# Patient Record
Sex: MALE | Race: OTHER | HISPANIC OR LATINO
[De-identification: names, ages, dates, MRNs, and addresses within clinical notes are randomized per-mention and may not be internally consistent; named-entity substitution may affect disease eponyms.]

---

## 2020-07-02 ENCOUNTER — TRANSCRIPTION ENCOUNTER (OUTPATIENT)
Age: 40
End: 2020-07-02

## 2020-07-02 ENCOUNTER — EMERGENCY (EMERGENCY)
Facility: HOSPITAL | Age: 40
LOS: 1 days | Discharge: HOME | End: 2020-07-02
Attending: EMERGENCY MEDICINE | Admitting: EMERGENCY MEDICINE
Payer: MEDICAID

## 2020-07-02 VITALS
HEART RATE: 73 BPM | SYSTOLIC BLOOD PRESSURE: 163 MMHG | TEMPERATURE: 98 F | DIASTOLIC BLOOD PRESSURE: 89 MMHG | RESPIRATION RATE: 18 BRPM | OXYGEN SATURATION: 99 %

## 2020-07-02 PROCEDURE — 76705 ECHO EXAM OF ABDOMEN: CPT | Mod: 26

## 2020-07-02 PROCEDURE — 99291 CRITICAL CARE FIRST HOUR: CPT | Mod: 25

## 2020-07-02 PROCEDURE — 76870 US EXAM SCROTUM: CPT | Mod: RT

## 2020-07-02 RX ADMIN — MORPHINE SULFATE 6 MILLIGRAM(S): 50 CAPSULE, EXTENDED RELEASE ORAL at 23:50
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
## 2020-07-03 ENCOUNTER — RESULT REVIEW (OUTPATIENT)
Age: 40
End: 2020-07-03

## 2020-07-03 ENCOUNTER — TRANSCRIPTION ENCOUNTER (OUTPATIENT)
Age: 40
End: 2020-07-03

## 2020-07-03 ENCOUNTER — INPATIENT (INPATIENT)
Facility: HOSPITAL | Age: 40
LOS: 2 days | Discharge: HOME | DRG: 511 | End: 2020-07-06
Attending: SURGERY | Admitting: SURGERY
Payer: MEDICAID

## 2020-07-03 VITALS
OXYGEN SATURATION: 99 % | DIASTOLIC BLOOD PRESSURE: 84 MMHG | TEMPERATURE: 99 F | HEART RATE: 104 BPM | SYSTOLIC BLOOD PRESSURE: 172 MMHG | RESPIRATION RATE: 20 BRPM

## 2020-07-03 VITALS
DIASTOLIC BLOOD PRESSURE: 97 MMHG | OXYGEN SATURATION: 97 % | RESPIRATION RATE: 17 BRPM | SYSTOLIC BLOOD PRESSURE: 184 MMHG | HEART RATE: 94 BPM

## 2020-07-03 DIAGNOSIS — S31.30XA UNSPECIFIED OPEN WOUND OF SCROTUM AND TESTES, INITIAL ENCOUNTER: ICD-10-CM

## 2020-07-03 LAB
ALBUMIN SERPL ELPH-MCNC: 4.5 G/DL — SIGNIFICANT CHANGE UP (ref 3.3–5)
ALP SERPL-CCNC: 47 U/L — SIGNIFICANT CHANGE UP (ref 40–120)
ALT FLD-CCNC: 48 U/L — HIGH (ref 10–45)
ANION GAP SERPL CALC-SCNC: 16 MMOL/L — SIGNIFICANT CHANGE UP (ref 5–17)
APPEARANCE UR: CLEAR — SIGNIFICANT CHANGE UP
APTT BLD: 30.7 SEC — SIGNIFICANT CHANGE UP (ref 27.5–35.5)
AST SERPL-CCNC: 67 U/L — HIGH (ref 10–40)
BASOPHILS # BLD AUTO: 0.05 K/UL — SIGNIFICANT CHANGE UP (ref 0–0.2)
BASOPHILS NFR BLD AUTO: 0.4 % — SIGNIFICANT CHANGE UP (ref 0–2)
BILIRUB SERPL-MCNC: 0.7 MG/DL — SIGNIFICANT CHANGE UP (ref 0.2–1.2)
BILIRUB UR-MCNC: NEGATIVE — SIGNIFICANT CHANGE UP
BLD GP AB SCN SERPL QL: NEGATIVE — SIGNIFICANT CHANGE UP
BUN SERPL-MCNC: 14 MG/DL — SIGNIFICANT CHANGE UP (ref 7–23)
CALCIUM SERPL-MCNC: 9 MG/DL — SIGNIFICANT CHANGE UP (ref 8.4–10.5)
CHLORIDE SERPL-SCNC: 98 MMOL/L — SIGNIFICANT CHANGE UP (ref 96–108)
CO2 SERPL-SCNC: 23 MMOL/L — SIGNIFICANT CHANGE UP (ref 22–31)
COLOR SPEC: SIGNIFICANT CHANGE UP
CREAT SERPL-MCNC: 1.06 MG/DL — SIGNIFICANT CHANGE UP (ref 0.5–1.3)
DIFF PNL FLD: NEGATIVE — SIGNIFICANT CHANGE UP
EOSINOPHIL # BLD AUTO: 0.14 K/UL — SIGNIFICANT CHANGE UP (ref 0–0.5)
EOSINOPHIL NFR BLD AUTO: 1 % — SIGNIFICANT CHANGE UP (ref 0–6)
ETHANOL SERPL-MCNC: SIGNIFICANT CHANGE UP MG/DL (ref 0–10)
GAS PNL BLDV: SIGNIFICANT CHANGE UP
GLUCOSE SERPL-MCNC: 114 MG/DL — HIGH (ref 70–99)
GLUCOSE UR QL: NEGATIVE — SIGNIFICANT CHANGE UP
HCT VFR BLD CALC: 44.3 % — SIGNIFICANT CHANGE UP (ref 39–50)
HGB BLD-MCNC: 15.5 G/DL — SIGNIFICANT CHANGE UP (ref 13–17)
IMM GRANULOCYTES NFR BLD AUTO: 0.4 % — SIGNIFICANT CHANGE UP (ref 0–1.5)
INR BLD: 1.25 RATIO — HIGH (ref 0.88–1.16)
KETONES UR-MCNC: ABNORMAL
LEUKOCYTE ESTERASE UR-ACNC: NEGATIVE — SIGNIFICANT CHANGE UP
LIDOCAIN IGE QN: 22 U/L — SIGNIFICANT CHANGE UP (ref 7–60)
LYMPHOCYTES # BLD AUTO: 1.05 K/UL — SIGNIFICANT CHANGE UP (ref 1–3.3)
LYMPHOCYTES # BLD AUTO: 7.6 % — LOW (ref 13–44)
MCHC RBC-ENTMCNC: 31.1 PG — SIGNIFICANT CHANGE UP (ref 27–34)
MCHC RBC-ENTMCNC: 35 GM/DL — SIGNIFICANT CHANGE UP (ref 32–36)
MCV RBC AUTO: 89 FL — SIGNIFICANT CHANGE UP (ref 80–100)
MONOCYTES # BLD AUTO: 1.08 K/UL — HIGH (ref 0–0.9)
MONOCYTES NFR BLD AUTO: 7.8 % — SIGNIFICANT CHANGE UP (ref 2–14)
NEUTROPHILS # BLD AUTO: 11.53 K/UL — HIGH (ref 1.8–7.4)
NEUTROPHILS NFR BLD AUTO: 82.8 % — HIGH (ref 43–77)
NITRITE UR-MCNC: NEGATIVE — SIGNIFICANT CHANGE UP
NRBC # BLD: 0 /100 WBCS — SIGNIFICANT CHANGE UP (ref 0–0)
PH UR: 6.5 — SIGNIFICANT CHANGE UP (ref 5–8)
PLATELET # BLD AUTO: 283 K/UL — SIGNIFICANT CHANGE UP (ref 150–400)
POTASSIUM SERPL-MCNC: 3.7 MMOL/L — SIGNIFICANT CHANGE UP (ref 3.5–5.3)
POTASSIUM SERPL-SCNC: 3.7 MMOL/L — SIGNIFICANT CHANGE UP (ref 3.5–5.3)
PROT SERPL-MCNC: 7.1 G/DL — SIGNIFICANT CHANGE UP (ref 6–8.3)
PROT UR-MCNC: ABNORMAL
PROTHROM AB SERPL-ACNC: 14.2 SEC — HIGH (ref 10.6–13.6)
RBC # BLD: 4.98 M/UL — SIGNIFICANT CHANGE UP (ref 4.2–5.8)
RBC # FLD: 12.1 % — SIGNIFICANT CHANGE UP (ref 10.3–14.5)
RH IG SCN BLD-IMP: POSITIVE — SIGNIFICANT CHANGE UP
RH IG SCN BLD-IMP: POSITIVE — SIGNIFICANT CHANGE UP
SARS-COV-2 RNA SPEC QL NAA+PROBE: SIGNIFICANT CHANGE UP
SODIUM SERPL-SCNC: 137 MMOL/L — SIGNIFICANT CHANGE UP (ref 135–145)
SP GR SPEC: >1.05 (ref 1.01–1.02)
UROBILINOGEN FLD QL: NEGATIVE — SIGNIFICANT CHANGE UP
WBC # BLD: 13.9 K/UL — HIGH (ref 3.8–10.5)
WBC # FLD AUTO: 13.9 K/UL — HIGH (ref 3.8–10.5)

## 2020-07-03 PROCEDURE — 73110 X-RAY EXAM OF WRIST: CPT | Mod: 26,RT

## 2020-07-03 PROCEDURE — 71260 CT THORAX DX C+: CPT | Mod: 26

## 2020-07-03 PROCEDURE — 72125 CT NECK SPINE W/O DYE: CPT | Mod: 26

## 2020-07-03 PROCEDURE — 76376 3D RENDER W/INTRP POSTPROCES: CPT | Mod: 26

## 2020-07-03 PROCEDURE — 71045 X-RAY EXAM CHEST 1 VIEW: CPT | Mod: 26

## 2020-07-03 PROCEDURE — 73560 X-RAY EXAM OF KNEE 1 OR 2: CPT | Mod: 26,RT

## 2020-07-03 PROCEDURE — 76870 US EXAM SCROTUM: CPT | Mod: 26

## 2020-07-03 PROCEDURE — 99223 1ST HOSP IP/OBS HIGH 75: CPT

## 2020-07-03 PROCEDURE — 73030 X-RAY EXAM OF SHOULDER: CPT | Mod: 26,RT

## 2020-07-03 PROCEDURE — 73090 X-RAY EXAM OF FOREARM: CPT | Mod: 26,LT

## 2020-07-03 PROCEDURE — 76377 3D RENDER W/INTRP POSTPROCES: CPT | Mod: 26

## 2020-07-03 PROCEDURE — 73552 X-RAY EXAM OF FEMUR 2/>: CPT | Mod: 26,RT

## 2020-07-03 PROCEDURE — 99222 1ST HOSP IP/OBS MODERATE 55: CPT | Mod: 57

## 2020-07-03 PROCEDURE — 70450 CT HEAD/BRAIN W/O DYE: CPT | Mod: 26

## 2020-07-03 PROCEDURE — 73564 X-RAY EXAM KNEE 4 OR MORE: CPT | Mod: 26,50

## 2020-07-03 PROCEDURE — 99285 EMERGENCY DEPT VISIT HI MDM: CPT

## 2020-07-03 PROCEDURE — 74177 CT ABD & PELVIS W/CONTRAST: CPT | Mod: 26

## 2020-07-03 PROCEDURE — 72190 X-RAY EXAM OF PELVIS: CPT | Mod: 26

## 2020-07-03 PROCEDURE — 88305 TISSUE EXAM BY PATHOLOGIST: CPT | Mod: 26

## 2020-07-03 PROCEDURE — 76377 3D RENDER W/INTRP POSTPROCES: CPT | Mod: 26,77

## 2020-07-03 PROCEDURE — 73700 CT LOWER EXTREMITY W/O DYE: CPT | Mod: 26,RT

## 2020-07-03 PROCEDURE — 73120 X-RAY EXAM OF HAND: CPT | Mod: 26,RT

## 2020-07-03 PROCEDURE — 73070 X-RAY EXAM OF ELBOW: CPT | Mod: 26,RT

## 2020-07-03 PROCEDURE — 73610 X-RAY EXAM OF ANKLE: CPT | Mod: 26,50

## 2020-07-03 PROCEDURE — 70486 CT MAXILLOFACIAL W/O DYE: CPT | Mod: 26

## 2020-07-03 PROCEDURE — 73100 X-RAY EXAM OF WRIST: CPT | Mod: 26,59,RT

## 2020-07-03 PROCEDURE — 73590 X-RAY EXAM OF LOWER LEG: CPT | Mod: 26,50

## 2020-07-03 PROCEDURE — 73060 X-RAY EXAM OF HUMERUS: CPT | Mod: 26,RT

## 2020-07-03 PROCEDURE — 54520 REMOVAL OF TESTIS: CPT | Mod: RT

## 2020-07-03 RX ORDER — SODIUM CHLORIDE 9 MG/ML
1000 INJECTION INTRAMUSCULAR; INTRAVENOUS; SUBCUTANEOUS ONCE
Refills: 0 | Status: COMPLETED | OUTPATIENT
Start: 2020-07-03 | End: 2020-07-03

## 2020-07-03 RX ORDER — HYDROMORPHONE HYDROCHLORIDE 2 MG/ML
1 INJECTION INTRAMUSCULAR; INTRAVENOUS; SUBCUTANEOUS ONCE
Refills: 0 | Status: DISCONTINUED | OUTPATIENT
Start: 2020-07-03 | End: 2020-07-03

## 2020-07-03 RX ORDER — FENTANYL CITRATE 50 UG/ML
50 INJECTION INTRAVENOUS ONCE
Refills: 0 | Status: DISCONTINUED | OUTPATIENT
Start: 2020-07-03 | End: 2020-07-03

## 2020-07-03 RX ORDER — IBUPROFEN 200 MG
400 TABLET ORAL EVERY 6 HOURS
Refills: 0 | Status: DISCONTINUED | OUTPATIENT
Start: 2020-07-03 | End: 2020-07-06

## 2020-07-03 RX ORDER — DEXTROSE MONOHYDRATE, SODIUM CHLORIDE, AND POTASSIUM CHLORIDE 50; .745; 4.5 G/1000ML; G/1000ML; G/1000ML
1000 INJECTION, SOLUTION INTRAVENOUS
Refills: 0 | Status: DISCONTINUED | OUTPATIENT
Start: 2020-07-03 | End: 2020-07-03

## 2020-07-03 RX ORDER — ONDANSETRON 8 MG/1
4 TABLET, FILM COATED ORAL EVERY 6 HOURS
Refills: 0 | Status: DISCONTINUED | OUTPATIENT
Start: 2020-07-03 | End: 2020-07-03

## 2020-07-03 RX ORDER — ENOXAPARIN SODIUM 100 MG/ML
40 INJECTION SUBCUTANEOUS DAILY
Refills: 0 | Status: DISCONTINUED | OUTPATIENT
Start: 2020-07-03 | End: 2020-07-03

## 2020-07-03 RX ORDER — DEXTROSE MONOHYDRATE, SODIUM CHLORIDE, AND POTASSIUM CHLORIDE 50; .745; 4.5 G/1000ML; G/1000ML; G/1000ML
1000 INJECTION, SOLUTION INTRAVENOUS
Refills: 0 | Status: DISCONTINUED | OUTPATIENT
Start: 2020-07-03 | End: 2020-07-04

## 2020-07-03 RX ORDER — OXYCODONE HYDROCHLORIDE 5 MG/1
5 TABLET ORAL EVERY 6 HOURS
Refills: 0 | Status: DISCONTINUED | OUTPATIENT
Start: 2020-07-03 | End: 2020-07-04

## 2020-07-03 RX ORDER — FENTANYL CITRATE 50 UG/ML
25 INJECTION INTRAVENOUS
Refills: 0 | Status: DISCONTINUED | OUTPATIENT
Start: 2020-07-03 | End: 2020-07-03

## 2020-07-03 RX ORDER — MORPHINE SULFATE 50 MG/1
6 CAPSULE, EXTENDED RELEASE ORAL ONCE
Refills: 0 | Status: DISCONTINUED | OUTPATIENT
Start: 2020-07-03 | End: 2020-07-02

## 2020-07-03 RX ORDER — MORPHINE SULFATE 50 MG/1
4 CAPSULE, EXTENDED RELEASE ORAL ONCE
Refills: 0 | Status: DISCONTINUED | OUTPATIENT
Start: 2020-07-03 | End: 2020-07-03

## 2020-07-03 RX ORDER — ACETAMINOPHEN 500 MG
650 TABLET ORAL EVERY 6 HOURS
Refills: 0 | Status: DISCONTINUED | OUTPATIENT
Start: 2020-07-03 | End: 2020-07-06

## 2020-07-03 RX ORDER — SODIUM CHLORIDE 9 MG/ML
1000 INJECTION, SOLUTION INTRAVENOUS
Refills: 0 | Status: DISCONTINUED | OUTPATIENT
Start: 2020-07-03 | End: 2020-07-03

## 2020-07-03 RX ADMIN — SODIUM CHLORIDE 75 MILLILITER(S): 9 INJECTION, SOLUTION INTRAVENOUS at 09:02

## 2020-07-03 RX ADMIN — Medication 400 MILLIGRAM(S): at 20:55

## 2020-07-03 RX ADMIN — SODIUM CHLORIDE 75 MILLILITER(S): 9 INJECTION, SOLUTION INTRAVENOUS at 04:46

## 2020-07-03 RX ADMIN — SODIUM CHLORIDE 3000 MILLILITER(S): 9 INJECTION INTRAMUSCULAR; INTRAVENOUS; SUBCUTANEOUS at 02:35

## 2020-07-03 RX ADMIN — OXYCODONE HYDROCHLORIDE 5 MILLIGRAM(S): 5 TABLET ORAL at 21:26

## 2020-07-03 RX ADMIN — Medication 650 MILLIGRAM(S): at 18:17

## 2020-07-03 RX ADMIN — HYDROMORPHONE HYDROCHLORIDE 1 MILLIGRAM(S): 2 INJECTION INTRAMUSCULAR; INTRAVENOUS; SUBCUTANEOUS at 00:00

## 2020-07-03 RX ADMIN — FENTANYL CITRATE 50 MICROGRAM(S): 50 INJECTION INTRAVENOUS at 01:36

## 2020-07-03 RX ADMIN — MORPHINE SULFATE 4 MILLIGRAM(S): 50 CAPSULE, EXTENDED RELEASE ORAL at 06:53

## 2020-07-03 NOTE — H&P ADULT - NSHPPHYSICALEXAM_GEN_ALL_CORE
Vital Signs Last 24 Hrs  T(C): 36.7 (03 Jul 2020 09:10), Max: 37.2 (03 Jul 2020 01:10)  T(F): 98 (03 Jul 2020 09:10), Max: 98.9 (03 Jul 2020 01:10)  HR: 82 (03 Jul 2020 09:10) (77 - 113)  BP: 149/95 (03 Jul 2020 09:10) (126/91 - 172/84)  BP(mean): --  RR: 18 (03 Jul 2020 09:10) (16 - 35)  SpO2: 97% (03 Jul 2020 09:10) (96% - 100%)    GCS of 15 (EVM)  Airway is patent  Breathing is symmetric and unlabored  CNII-XII grossly intact  HEENT: Normocephalic, atraumatic, KODAK, EOM wnl, no otorrhea or hemotympanum b/l, no epistaxis or d/c b/l nares, no craniofacial bony pathology or tenderness b/l  Neck: Pt in hard cervical collar at time of exam. No crepitus, no ecchymosis, no hematoma, to exam, no JVD, no tracheal deviation  Cspine/thoracolumbrosacral spine: no gross bony pathology or tenderness to exam  Cardiovascular: S1S2 Present  Chest: no gross rib pathology or tenderness to exam. No sternal pathology or tenderness to exam. No crepitus, no ecchymosis, no hematoma. No penetrating thorcoabdominal trauma  Respiratory: Rate is 18; Respiratory Effort normal; no wheezes, rales or rhonchi to exam  ABD: bowel sounds (+), soft, nontender, non distended, no rebound, no guarding, no rigidity, no skin changes to exam. No pelvic instability to exam, no skin changes  Rectal: anal sphincter tone normal  Genitourinary: R scrotal swelling and tenderness. no blood at meatus.   Musculoskeletal: Pt has palpable b/l radial, femoral, dorsalis pedis pulses. All digits are warm and well perfused. deformity of R wrist, R knee. tenderness of b/l ankles. Pt demonstrates grossly intact sensoromotor function. Pt has good capillary refill to digits, no calf edema or tenderness to exam.  Skin: no lesions or rashes to exam
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
## 2020-07-03 NOTE — ED PROVIDER NOTE - NSRISKOFTRANSFER_ED_A_ED
Increased Pain/Deterioration of Condition En Route/Transportation Risk (There is always a risk of traffic delays resulting in deterioration of condition.)
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
## 2020-07-03 NOTE — CHART NOTE - NSCHARTNOTEFT_GEN_A_CORE
Post Operative Note  Patient: SHANA FISHRE 39y (1980) Male   MRN: [id number]  Location: [facility] 7TOW 703 W1  Visit: 07-03-20 Inpatient  Date: 07-03-20 @ 16:34    Procedure: S/P right orchiectomy and scrotal exploration    Subjective: Patient reports some pain in his right arm and leg and some discomfort in his right scrotal area around the drainage site. He endorses that the pain in his right scrotal area has improved since the procedure.      Objective:  Vitals: T(F): 97.8 (07-03-20 @ 16:06), Max: 98.9 (07-03-20 @ 01:10)  HR: 61 (07-03-20 @ 16:06)  BP: 144/68 (07-03-20 @ 16:06) (126/91 - 172/84)  RR: 16 (07-03-20 @ 16:06)  SpO2: 96% (07-03-20 @ 16:06)  Vent Settings:     In:   07-03-20 @ 07:01  -  07-03-20 @ 16:34  --------------------------------------------------------  IN: 0 mL      IV Fluids:     Out:   07-03-20 @ 07:01  -  07-03-20 @ 16:34  --------------------------------------------------------  OUT: 500 mL      EBL:     Voided Urine:   07-03-20 @ 07:01  -  07-03-20 @ 16:34  --------------------------------------------------------  OUT: 500 mL      Martinez Catheter: no  Drains: no  MARLA: no  Chest Tube: no     NG Tube: no        Physical Examination:  General: NAD, resting comfortably in bed  HEENT: Normocephalic atraumatic  Respiratory: Nonlabored respirations, normal CW expansion.  Cardio: S1S2, regular rate and rhythm.  Abdomen: softly distended, appropriately tender, surgical incisions are c/d/i.   Vascular: extremities are warm and well perfused.     Imaging:  No post-op imaging studies    Assessment:  39y Male patient S/P right orchiectomy and scrotal exploration for ruptured right testicle with hematocele.    Plan:  - Pain control PRN  - Diet: regular  - Activity: Bed Rest  - DVT ppx: Lovenox 40 mg    Date/Time: 07-03-20 @ 16:34    Trauma [id number]
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
## 2020-07-03 NOTE — ED ADULT NURSE REASSESSMENT NOTE - NS ED NURSE REASSESS COMMENT FT1
report received from Rita HAYES. Patient is a/ox4, well appearing, right arm in soft cast. Right leg immobilized with splint. right teste inflammed and tender. Respirations unlabored. Patient has strong peripheral pulses, skin is warm and dry, cap refill <3 sec. Patient seen and evaluated by urology, awaiting recommendations and surgery for injuries. VSS, afebrile. Pending dispo.
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
## 2020-07-03 NOTE — ED ADULT NURSE REASSESSMENT NOTE - NS ED NURSE REASSESS COMMENT FT1
Patient back from ultrasound, vital signs stable, 9/10 pain, MD West aware. In no acute distress at this time. Patient receiving maintenance fluids with no redness, drainage or swelling at site. Awaiting dispo.

## 2020-07-03 NOTE — ED ADULT NURSE NOTE - OBJECTIVE STATEMENT
alert oriented  right wrist swollen with deformity and painful in sling right radial pulse positive with doppler  right knee swollen and painful  c/o low back pain and right testicular pain  has abrasion on right buttock and right knee  right foot swollen and painful  DP and PT pulses palpable toes with good capillary refill alert oriented  right wrist swollen with deformity and painful in sling right radial pulse positive with doppler  right knee swollen and painful  c/o low back pain and right testicular pain  has abrasion on right buttock and right knee  right foot swollen and painful  DP and PT pulses palpable toes with good capillary refill seen by Dr Arenas
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
## 2020-07-03 NOTE — H&P ADULT - NSHPLABSRESULTS_GEN_ALL_CORE
15.5   13.90 )-----------( 283      ( 03 Jul 2020 02:49 )             44.3       07-03    137  |  98  |  14  ----------------------------<  114<H>  3.7   |  23  |  1.06    Ca    9.0      03 Jul 2020 02:49    TPro  7.1  /  Alb  4.5  /  TBili  0.7  /  DBili  x   /  AST  67<H>  /  ALT  48<H>  /  AlkPhos  47  07-03              Urinalysis Basic - ( 03 Jul 2020 04:05 )    Color: Light Yellow / Appearance: Clear / SG: >1.050 / pH: x  Gluc: x / Ketone: Small  / Bili: Negative / Urobili: Negative   Blood: x / Protein: Trace / Nitrite: Negative   Leuk Esterase: Negative / RBC: 1 /hpf / WBC 1 /HPF   Sq Epi: x / Non Sq Epi: 2 /hpf / Bacteria: Negative        PT/INR - ( 03 Jul 2020 02:49 )   PT: 14.2 sec;   INR: 1.25 ratio         PTT - ( 03 Jul 2020 02:49 )  PTT:30.7 sec          CAPILLARY BLOOD GLUCOSE    IMAGING - Reviewed
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
## 2020-07-03 NOTE — ED PROCEDURE NOTE - PROCEDURE ADDITIONAL DETAILS
Limited testicular ultrasound shows normal left testicle and a 2cm heterogenous mass at right inferior testicle with avascular area and surrounding moderate hydrocele. Given setting of trauma concern for testicular rupture. See images and report in chart.  Madisyn [id number]

## 2020-07-03 NOTE — ED PROVIDER NOTE - CARE PLAN
Principal Discharge DX:	Trauma  Secondary Diagnosis:	Wrist fracture, closed, right, initial encounter
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
## 2020-07-03 NOTE — PROGRESS NOTE ADULT - SUBJECTIVE AND OBJECTIVE BOX
Post op Check    Pt 39 Y/M hx testicle trouble s/p orchiectomy. seen and examined c/o some pain on the incision side. Denies SOB/CP/N/V.     Vital Signs Last 24 Hrs  T(C): 37 (03 Jul 2020 17:05), Max: 37.2 (03 Jul 2020 01:10)  T(F): 98.6 (03 Jul 2020 17:05), Max: 98.9 (03 Jul 2020 01:10)  HR: 79 (03 Jul 2020 17:05) (60 - 113)  BP: 138/90 (03 Jul 2020 17:05) (126/91 - 172/84)  BP(mean): 110 (03 Jul 2020 13:00) (97 - 115)  RR: 18 (03 Jul 2020 17:05) (14 - 35)  SpO2: 95% (03 Jul 2020 17:05) (95% - 100%)    I&O's Summary    03 Jul 2020 07:01  -  03 Jul 2020 20:45  --------------------------------------------------------  IN: 180 mL / OUT: 500 mL / NET: -320 mL        Physical Exam  Gen: NAD, A&Ox3  Pulm: No respiratory distress, no subcostal retractions  CV: RRR, no JVD  Abd: Soft, NT, ND  : Dressing on the scrotal area. Little saturation. voiding free                          15.5   13.90 )-----------( 283      ( 03 Jul 2020 02:49 )             44.3       07-03    137  |  98  |  14  ----------------------------<  114<H>  3.7   |  23  |  1.06    Ca    9.0      03 Jul 2020 02:49    TPro  7.1  /  Alb  4.5  /  TBili  0.7  /  DBili  x   /  AST  67<H>  /  ALT  48<H>  /  AlkPhos  47  07-03      A/P: 39y Male s/p R orchiectomy and scrotal exploration  pain control  DVT prophylaxis/OOB  Incentive spirometry  Strict I&O's

## 2020-07-03 NOTE — ED PROVIDER NOTE - CARE PLAN
Principal Discharge DX:	Rupture of testis, initial encounter  Secondary Diagnosis:	Distal radius fracture
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
## 2020-07-03 NOTE — ED PROVIDER NOTE - MUSCULOSKELETAL, MLM
no c-spine tenderness, c-collar left in place. R arm in splint and sling placed by EMS with obvious deformity of the wrist, pulse present with doppler, neurovascularly intact. abrasion on the R buttock, no pelvic wall instability, no thoracic or lumbar spinal tenderness or stepoffs. deformity/possible dislocation of the R knee with large lateral hematoma ttp. step off swelling of R medal malleolus with ttp. 2+ DP and PT pulses.

## 2020-07-03 NOTE — ED ADULT NURSE REASSESSMENT NOTE - NS ED NURSE REASSESS COMMENT FT1
Patient dizzy, MD [place] aware. Patient medicated and given urinal. Bed locked and in lowest position for safety.

## 2020-07-03 NOTE — ED PROVIDER NOTE - PHYSICAL EXAMINATION
Primary Survey:  A-Intact  B-Bilateral BS  C-Pulses x4 extremities  D-GCS 15  E-Fully exposed    Secondary survey:  Gen: NAD, AOx3, able to make needs known, non-toxic  Head: NCAT  HEENT: EOMI, oral mucosa moist, normal conjunctiva. Pupils 3 mm equal and reactive b/l  Lung: CTAB, no respiratory distress, no wheezes/rhonchi/rales B/L, speaking in full sentences  CV: RRR, no murmurs  Abd: non distended, soft, mild RLQ tenderness, no guarding, no CVA tenderness  : Scrotum edematous and tender on R testicle  MSK: c-collar in place, R wrist tender to palpation w/ obvious deformity, R knee edematous, b/l medial mals tender to palpation. Pelvis stable. No midline spinal tenderness. No chest wall tenderness  Neuro: No focal sensory deficits, moving extremities on command. RUE and RLE movement limited 2/2 to pain  Skin: Warm, well perfused  Psych: normal affect

## 2020-07-03 NOTE — ED ADULT NURSE NOTE - NS ED NURSE AMBULANCES2
sore nipples/knowledge deficit/ineffective breastfeeding/engorgement
[facility]
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
## 2020-07-03 NOTE — ED PROVIDER NOTE - ENMT, MLM
Airway patent, Nasal mucosa clear. Mouth with normal mucosa. no loose teeth present. no abrasions or lacerations on the face. no Airway patent, Nasal mucosa clear. Mouth with normal mucosa. no loose teeth present. no abrasions or lacerations on the face. no drainage from the ears, prescott signs or racoon eyes.

## 2020-07-03 NOTE — CHART NOTE - NSCHARTNOTEFT_GEN_A_CORE
Pt examined at bedside for removal of cervical collar. He was found to have full sensation and appropriate motor in bilateral upper extremities. No TTP in the c spine. Pt was able to move his neck in multiple planes of motion without pain. The cervical collar was removed after negative exam findings.     Cristofer Moulton, PGY-1  Trauma Pager #[phone]
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
## 2020-07-03 NOTE — CONSULT NOTE ADULT - I WAS PHYSICALLY PRESENT FOR THE KEY PORTIONS OF THE EVALUATION AND MANAGEMENT (E/M) SERVICE PROVIDED.  I AGREE WITH THE ABOVE HISTORY, PHYSICAL, AND PLAN WHICH I HAVE REVIEWED AND EDITED WHERE APPROPRIATE
Medication:  lisdexamfetamine (VYVANSE) 20 MG capsule.     Pharmacy has been verified.    [] Patient completely out of medication     Patient currently has follow up appointment scheduled    Message to Prescriber:    Statement Selected

## 2020-07-04 DIAGNOSIS — S52.509A UNSPECIFIED FRACTURE OF THE LOWER END OF UNSPECIFIED RADIUS, INITIAL ENCOUNTER FOR CLOSED FRACTURE: ICD-10-CM

## 2020-07-04 LAB
ANION GAP SERPL CALC-SCNC: 11 MMOL/L — SIGNIFICANT CHANGE UP (ref 5–17)
BLD GP AB SCN SERPL QL: NEGATIVE — SIGNIFICANT CHANGE UP
BUN SERPL-MCNC: 10 MG/DL — SIGNIFICANT CHANGE UP (ref 7–23)
CALCIUM SERPL-MCNC: 8.2 MG/DL — LOW (ref 8.4–10.5)
CHLORIDE SERPL-SCNC: 100 MMOL/L — SIGNIFICANT CHANGE UP (ref 96–108)
CO2 SERPL-SCNC: 25 MMOL/L — SIGNIFICANT CHANGE UP (ref 22–31)
CREAT SERPL-MCNC: 1.04 MG/DL — SIGNIFICANT CHANGE UP (ref 0.5–1.3)
GLUCOSE SERPL-MCNC: 156 MG/DL — HIGH (ref 70–99)
HCT VFR BLD CALC: 30.7 % — LOW (ref 39–50)
HGB BLD-MCNC: 10.4 G/DL — LOW (ref 13–17)
INR BLD: 1.41 RATIO — HIGH (ref 0.88–1.16)
MAGNESIUM SERPL-MCNC: 2.1 MG/DL — SIGNIFICANT CHANGE UP (ref 1.6–2.6)
MCHC RBC-ENTMCNC: 30.6 PG — SIGNIFICANT CHANGE UP (ref 27–34)
MCHC RBC-ENTMCNC: 33.9 GM/DL — SIGNIFICANT CHANGE UP (ref 32–36)
MCV RBC AUTO: 90.3 FL — SIGNIFICANT CHANGE UP (ref 80–100)
NRBC # BLD: 0 /100 WBCS — SIGNIFICANT CHANGE UP (ref 0–0)
PHOSPHATE SERPL-MCNC: 3.4 MG/DL — SIGNIFICANT CHANGE UP (ref 2.5–4.5)
PLATELET # BLD AUTO: 187 K/UL — SIGNIFICANT CHANGE UP (ref 150–400)
POTASSIUM SERPL-MCNC: 4.2 MMOL/L — SIGNIFICANT CHANGE UP (ref 3.5–5.3)
POTASSIUM SERPL-SCNC: 4.2 MMOL/L — SIGNIFICANT CHANGE UP (ref 3.5–5.3)
PROTHROM AB SERPL-ACNC: 16 SEC — HIGH (ref 10.6–13.6)
RBC # BLD: 3.4 M/UL — LOW (ref 4.2–5.8)
RBC # FLD: 11.9 % — SIGNIFICANT CHANGE UP (ref 10.3–14.5)
RH IG SCN BLD-IMP: POSITIVE — SIGNIFICANT CHANGE UP
SODIUM SERPL-SCNC: 136 MMOL/L — SIGNIFICANT CHANGE UP (ref 135–145)
WBC # BLD: 7.47 K/UL — SIGNIFICANT CHANGE UP (ref 3.8–10.5)
WBC # FLD AUTO: 7.47 K/UL — SIGNIFICANT CHANGE UP (ref 3.8–10.5)

## 2020-07-04 PROCEDURE — 73721 MRI JNT OF LWR EXTRE W/O DYE: CPT | Mod: 26,RT

## 2020-07-04 PROCEDURE — 25609 OPTX DST RD XART FX/EP SEP3+: CPT | Mod: RT

## 2020-07-04 RX ORDER — MORPHINE SULFATE 50 MG/1
2 CAPSULE, EXTENDED RELEASE ORAL EVERY 4 HOURS
Refills: 0 | Status: DISCONTINUED | OUTPATIENT
Start: 2020-07-04 | End: 2020-07-06

## 2020-07-04 RX ORDER — OXYCODONE HYDROCHLORIDE 5 MG/1
5 TABLET ORAL ONCE
Refills: 0 | Status: DISCONTINUED | OUTPATIENT
Start: 2020-07-04 | End: 2020-07-04

## 2020-07-04 RX ORDER — ONDANSETRON 8 MG/1
4 TABLET, FILM COATED ORAL ONCE
Refills: 0 | Status: DISCONTINUED | OUTPATIENT
Start: 2020-07-04 | End: 2020-07-04

## 2020-07-04 RX ORDER — OXYCODONE HYDROCHLORIDE 5 MG/1
10 TABLET ORAL EVERY 4 HOURS
Refills: 0 | Status: DISCONTINUED | OUTPATIENT
Start: 2020-07-04 | End: 2020-07-06

## 2020-07-04 RX ORDER — ENOXAPARIN SODIUM 100 MG/ML
40 INJECTION SUBCUTANEOUS DAILY
Refills: 0 | Status: DISCONTINUED | OUTPATIENT
Start: 2020-07-04 | End: 2020-07-06

## 2020-07-04 RX ORDER — SODIUM CHLORIDE 9 MG/ML
1000 INJECTION INTRAMUSCULAR; INTRAVENOUS; SUBCUTANEOUS
Refills: 0 | Status: DISCONTINUED | OUTPATIENT
Start: 2020-07-04 | End: 2020-07-04

## 2020-07-04 RX ORDER — BENZOCAINE AND MENTHOL 5; 1 G/100ML; G/100ML
1 LIQUID ORAL
Refills: 0 | Status: DISCONTINUED | OUTPATIENT
Start: 2020-07-04 | End: 2020-07-06

## 2020-07-04 RX ORDER — HYDROMORPHONE HYDROCHLORIDE 2 MG/ML
0.5 INJECTION INTRAMUSCULAR; INTRAVENOUS; SUBCUTANEOUS
Refills: 0 | Status: DISCONTINUED | OUTPATIENT
Start: 2020-07-04 | End: 2020-07-04

## 2020-07-04 RX ORDER — OXYCODONE HYDROCHLORIDE 5 MG/1
5 TABLET ORAL EVERY 4 HOURS
Refills: 0 | Status: DISCONTINUED | OUTPATIENT
Start: 2020-07-04 | End: 2020-07-06

## 2020-07-04 RX ORDER — ACETAMINOPHEN 500 MG
975 TABLET ORAL EVERY 8 HOURS
Refills: 0 | Status: DISCONTINUED | OUTPATIENT
Start: 2020-07-04 | End: 2020-07-06

## 2020-07-04 RX ORDER — ONDANSETRON 8 MG/1
4 TABLET, FILM COATED ORAL EVERY 6 HOURS
Refills: 0 | Status: DISCONTINUED | OUTPATIENT
Start: 2020-07-04 | End: 2020-07-06

## 2020-07-04 RX ORDER — CEFAZOLIN SODIUM 1 G
2000 VIAL (EA) INJECTION EVERY 8 HOURS
Refills: 0 | Status: COMPLETED | OUTPATIENT
Start: 2020-07-04 | End: 2020-07-05

## 2020-07-04 RX ADMIN — OXYCODONE HYDROCHLORIDE 10 MILLIGRAM(S): 5 TABLET ORAL at 23:20

## 2020-07-04 RX ADMIN — Medication 400 MILLIGRAM(S): at 05:40

## 2020-07-04 RX ADMIN — Medication 1 TABLET(S): at 20:27

## 2020-07-04 RX ADMIN — DEXTROSE MONOHYDRATE, SODIUM CHLORIDE, AND POTASSIUM CHLORIDE 125 MILLILITER(S): 50; .745; 4.5 INJECTION, SOLUTION INTRAVENOUS at 00:16

## 2020-07-04 RX ADMIN — Medication 100 MILLIGRAM(S): at 20:27

## 2020-07-04 NOTE — PROGRESS NOTE ADULT - ASSESSMENT
A/P: 39y Male s/p R orchiectomy and scrotal exploration for ruptured testicle    pain control  DVT prophylaxis/OOB  Incentive spirometry  Strict I&O's  continue scrotal support/supportive underwear  follow-up in approx 3 weeks  please call with further questions

## 2020-07-04 NOTE — PROGRESS NOTE ADULT - SUBJECTIVE AND OBJECTIVE BOX
afebrile, minimal drainage from R scrotum, penrose drain removed, chromic stitch remains in place    T(F): 99.8, Max: 99.8 (07-04-20 @ 17:14)  HR: 96  BP: 148/87  SpO2: 98%    OUT:    Voided: 1000 mL  Total OUT: 1000 mL      OUT:    Voided: 600 mL  Total OUT: 600 mL        Gen NAD  Abd soft, NTND   right scrotum no significant warmth or edema, penrose removed at beside easily no resistance      07-04 @ 04:48    WBC 7.47  / Hct 30.7  / SCr 1.04     07-03 @ 02:49    WBC 13.90 / Hct 44.3  / SCr 1.06

## 2020-07-04 NOTE — BRIEF OPERATIVE NOTE - NSICDXBRIEFPROCEDURE_GEN_ALL_CORE_FT
PROCEDURES:  ORIF, 2-part fracture, radius, distal, intra-articular 04-Jul-2020 13:40:44  Jonnathan Velásquez
PROCEDURES:  Orchiectomy, right 03-Jul-2020 11:17:16  Asia Sorto  Scrotal exploration 03-Jul-2020 11:17:08  Asia Sorto

## 2020-07-04 NOTE — PROGRESS NOTE ADULT - ASSESSMENT
Dx:  Right distal radius fracture      Plan    OR today for ORIF  NPO     Kiera Reynolds PA-C   Beeper    [id number]

## 2020-07-04 NOTE — PROGRESS NOTE ADULT - SUBJECTIVE AND OBJECTIVE BOX
ORTHO ATTENDING POST OP    s/p ORIF R distal radius  Bulky dressing  elevation  keep dressing clean and dry until office visit  sling  Can WB  RUE w platform walker  ancef x 24 h   ROM as tolerated RUE  f/u MRI R knee  OOB in AM  PT/OT consult

## 2020-07-04 NOTE — PRE-ANESTHESIA EVALUATION ADULT - NSANTHADDINFOFT_GEN_ALL_CORE
r/b/a including but not limited to blleding infection nerve injury discussed and agreed upon with patient

## 2020-07-04 NOTE — BRIEF OPERATIVE NOTE - NSICDXBRIEFPOSTOP_GEN_ALL_CORE_FT
POST-OP DIAGNOSIS:  Distal radius fracture, right 04-Jul-2020 13:41:14  Jonnathan Velásquez
POST-OP DIAGNOSIS:  Testicle trouble 03-Jul-2020 11:19:11  Asia Sorto

## 2020-07-04 NOTE — BRIEF OPERATIVE NOTE - NSICDXBRIEFPREOP_GEN_ALL_CORE_FT
PRE-OP DIAGNOSIS:  Distal radius fracture, right 04-Jul-2020 13:41:02  Jonnathan Velásquez
PRE-OP DIAGNOSIS:  Testicle trouble 03-Jul-2020 11:18:10  Asia Sorto

## 2020-07-04 NOTE — BRIEF OPERATIVE NOTE - OPERATION/FINDINGS
Open reduction internal fixation of right distal radius fracture
Scrotal exploration, R testis with multiple defects in tunica albuginea, complete disruption of vascular structures, R orchiectomy performed

## 2020-07-04 NOTE — PROGRESS NOTE ADULT - SUBJECTIVE AND OBJECTIVE BOX
Post op Day 1 s/p right orchiectomy    Patient resting without complaints.       T(C): 36.6 (07-04-20 @ 04:45), Max: 37 (07-03-20 @ 17:05)  HR: 68 (07-04-20 @ 04:45) (60 - 87)  BP: 119/75 (07-04-20 @ 04:45) (119/75 - 155/86)  RR: 18 (07-04-20 @ 04:45) (14 - 18)  SpO2: 95% (07-04-20 @ 04:45) (95% - 100%)  Wt(kg): --    Exam:  Alert and Oriented,     EXT:          RUE  Dressing c/d/i:         hand swollen         fingers warm and mobile         sensation grossly intact                                      10.4   7.47  )-----------( 187      ( 04 Jul 2020 04:48 )             30.7    07-04    136  |  100  |  10  ----------------------------<  156<H>  4.2   |  25  |  1.04    Ca    8.2<L>      04 Jul 2020 04:48  Phos  3.4     07-04  Mg     2.1     07-04    TPro  7.1  /  Alb  4.5  /  TBili  0.7  /  DBili  x   /  AST  67<H>  /  ALT  48<H>  /  AlkPhos  47  07-03

## 2020-07-04 NOTE — PROGRESS NOTE ADULT - ASSESSMENT
39y Male patient S/P right orchiectomy and scrotal exploration for ruptured right testicle with hematocele.    Plan:  - Pain control PRN  -Diet, advanced as tolerated  - DVT ppx: Lovenox 40 mg  -OR with ortho today      Trauma [phone]

## 2020-07-04 NOTE — PROGRESS NOTE ADULT - SUBJECTIVE AND OBJECTIVE BOX
ORTHO POC NOTE      Resting without complaints.  No Chest Pain, SOB, N/V.    T(C): 36.5 (07-04-20 @ 13:48), Max: 37 (07-03-20 @ 17:05)  HR: 71 (07-04-20 @ 14:45) (58 - 85)  BP: 154/89 (07-04-20 @ 14:45) (119/75 - 171/105)  RR: 16 (07-04-20 @ 14:30) (16 - 18)  SpO2: 92% (07-04-20 @ 14:30) (92% - 97%)      Exam:   Alert and Oriented; No Acute Distress  Card: +S1/S2, RRR  Pulm: CTAB  Ext: RUE: In sling, dressing C/D/I, compartments soft, dorsal hand edema, fingers warm and mobile with brisk cap refill,   dull sensation grossly intact   Calves soft, non-tender bilaterally  (+) PF/DF  (+) Distal pulses    Intraop Xray: In chart                          10.4   7.47  )-----------( 187      ( 04 Jul 2020 04:48 )             30.7    07-04    136  |  100  |  10  ----------------------------<  156<H>  4.2   |  25  |  1.04    Ca    8.2<L>      04 Jul 2020 04:48  Phos  3.4     07-04  Mg     2.1     07-04    TPro  7.1  /  Alb  4.5  /  TBili  0.7  /  DBili  x   /  AST  67<H>  /  ALT  48<H>  /  AlkPhos  47  07-03        Patient is a 39y old  Male s/p Right Radius ORIF    Plan:  - Pain Control PRN  - DVT ppx- Lovenox  - RUE NWB; RUE may WBAT with platform walker only  - RLE NWB--MRI of R Knee pending to r/o ligamentous injury  - Venodynes/IS  - Check labs in AM  - Plan as per primary team      Bob Hernández PA-C  Orthopedic Surgery  [id number]

## 2020-07-04 NOTE — PROGRESS NOTE ADULT - SUBJECTIVE AND OBJECTIVE BOX
SURGERY DAILY PROGRESS NOTE:       SUBJECTIVE/ROS: Patient reports that testicular pain is improved and that he can urinate. Complains of right leg pain.  Denies nausea, vomiting, chest pain, shortness of breath         MEDICATIONS  (STANDING):  dextrose 5% + sodium chloride 0.45% with potassium chloride 20 mEq/L 1000 milliLiter(s) (125 mL/Hr) IV Continuous <Continuous>  ibuprofen  Tablet. 400 milliGRAM(s) Oral every 6 hours    MEDICATIONS  (PRN):  acetaminophen   Tablet .. 650 milliGRAM(s) Oral every 6 hours PRN Mild Pain (1 - 3)  oxyCODONE    IR 5 milliGRAM(s) Oral every 6 hours PRN Moderate Pain (4 - 6)      OBJECTIVE:    Vital Signs Last 24 Hrs  T(C): 36.9 (04 Jul 2020 08:20), Max: 37 (03 Jul 2020 17:05)  T(F): 98.4 (04 Jul 2020 08:20), Max: 98.6 (03 Jul 2020 17:05)  HR: 58 (04 Jul 2020 08:20) (58 - 87)  BP: 122/73 (04 Jul 2020 08:20) (119/75 - 155/86)  BP(mean): 110 (03 Jul 2020 13:00) (97 - 115)  RR: 18 (04 Jul 2020 08:20) (14 - 18)  SpO2: 94% (04 Jul 2020 08:20) (94% - 100%)        I&O's Detail    03 Jul 2020 07:01  -  04 Jul 2020 07:00  --------------------------------------------------------  IN:    dextrose 5% + sodium chloride 0.45% with potassium chloride 20 mEq/L: 875 mL    Oral Fluid: 500 mL  Total IN: 1375 mL    OUT:    Voided: 1000 mL  Total OUT: 1000 mL    Total NET: 375 mL      04 Jul 2020 07:01  -  04 Jul 2020 10:32  --------------------------------------------------------  IN:  Total IN: 0 mL    OUT:    Voided: 400 mL  Total OUT: 400 mL    Total NET: -400 mL          Daily     Daily     LABS:                        10.4   7.47  )-----------( 187      ( 04 Jul 2020 04:48 )             30.7     07-04    136  |  100  |  10  ----------------------------<  156<H>  4.2   |  25  |  1.04    Ca    8.2<L>      04 Jul 2020 04:48  Phos  3.4     07-04  Mg     2.1     07-04    TPro  7.1  /  Alb  4.5  /  TBili  0.7  /  DBili  x   /  AST  67<H>  /  ALT  48<H>  /  AlkPhos  47  07-03    PT/INR - ( 04 Jul 2020 04:48 )   PT: 16.0 sec;   INR: 1.41 ratio         PTT - ( 03 Jul 2020 02:49 )  PTT:30.7 sec  Urinalysis Basic - ( 03 Jul 2020 04:05 )    Color: Light Yellow / Appearance: Clear / SG: >1.050 / pH: x  Gluc: x / Ketone: Small  / Bili: Negative / Urobili: Negative   Blood: x / Protein: Trace / Nitrite: Negative   Leuk Esterase: Negative / RBC: 1 /hpf / WBC 1 /HPF   Sq Epi: x / Non Sq Epi: 2 /hpf / Bacteria: Negative                  Skin: no gross lesions  Lymph Nodes: no gross adenopathy  Musculoskeletal: equal strength bilateral upper and lower extremities  Psychiatric: oriented x 3; appropriate

## 2020-07-05 LAB
ANION GAP SERPL CALC-SCNC: 7 MMOL/L — SIGNIFICANT CHANGE UP (ref 5–17)
BUN SERPL-MCNC: 11 MG/DL — SIGNIFICANT CHANGE UP (ref 7–23)
CALCIUM SERPL-MCNC: 8.2 MG/DL — LOW (ref 8.4–10.5)
CHLORIDE SERPL-SCNC: 101 MMOL/L — SIGNIFICANT CHANGE UP (ref 96–108)
CO2 SERPL-SCNC: 29 MMOL/L — SIGNIFICANT CHANGE UP (ref 22–31)
CREAT SERPL-MCNC: 1.13 MG/DL — SIGNIFICANT CHANGE UP (ref 0.5–1.3)
GLUCOSE SERPL-MCNC: 122 MG/DL — HIGH (ref 70–99)
HCT VFR BLD CALC: 28.6 % — LOW (ref 39–50)
HGB BLD-MCNC: 10 G/DL — LOW (ref 13–17)
MAGNESIUM SERPL-MCNC: 2.1 MG/DL — SIGNIFICANT CHANGE UP (ref 1.6–2.6)
MCHC RBC-ENTMCNC: 31.9 PG — SIGNIFICANT CHANGE UP (ref 27–34)
MCHC RBC-ENTMCNC: 35 GM/DL — SIGNIFICANT CHANGE UP (ref 32–36)
MCV RBC AUTO: 91.4 FL — SIGNIFICANT CHANGE UP (ref 80–100)
NRBC # BLD: 0 /100 WBCS — SIGNIFICANT CHANGE UP (ref 0–0)
PHOSPHATE SERPL-MCNC: 3.6 MG/DL — SIGNIFICANT CHANGE UP (ref 2.5–4.5)
PLATELET # BLD AUTO: 186 K/UL — SIGNIFICANT CHANGE UP (ref 150–400)
POTASSIUM SERPL-MCNC: 4.1 MMOL/L — SIGNIFICANT CHANGE UP (ref 3.5–5.3)
POTASSIUM SERPL-SCNC: 4.1 MMOL/L — SIGNIFICANT CHANGE UP (ref 3.5–5.3)
RBC # BLD: 3.13 M/UL — LOW (ref 4.2–5.8)
RBC # FLD: 12.4 % — SIGNIFICANT CHANGE UP (ref 10.3–14.5)
SODIUM SERPL-SCNC: 137 MMOL/L — SIGNIFICANT CHANGE UP (ref 135–145)
WBC # BLD: 8.36 K/UL — SIGNIFICANT CHANGE UP (ref 3.8–10.5)
WBC # FLD AUTO: 8.36 K/UL — SIGNIFICANT CHANGE UP (ref 3.8–10.5)

## 2020-07-05 RX ORDER — ACETAMINOPHEN 500 MG
1000 TABLET ORAL ONCE
Refills: 0 | Status: COMPLETED | OUTPATIENT
Start: 2020-07-05 | End: 2020-07-05

## 2020-07-05 RX ADMIN — OXYCODONE HYDROCHLORIDE 10 MILLIGRAM(S): 5 TABLET ORAL at 14:30

## 2020-07-05 RX ADMIN — OXYCODONE HYDROCHLORIDE 10 MILLIGRAM(S): 5 TABLET ORAL at 07:26

## 2020-07-05 RX ADMIN — OXYCODONE HYDROCHLORIDE 10 MILLIGRAM(S): 5 TABLET ORAL at 20:28

## 2020-07-05 RX ADMIN — Medication 100 MILLIGRAM(S): at 04:24

## 2020-07-05 RX ADMIN — Medication 400 MILLIGRAM(S): at 03:57

## 2020-07-05 RX ADMIN — Medication 975 MILLIGRAM(S): at 23:40

## 2020-07-05 RX ADMIN — ENOXAPARIN SODIUM 40 MILLIGRAM(S): 100 INJECTION SUBCUTANEOUS at 12:45

## 2020-07-05 RX ADMIN — MORPHINE SULFATE 2 MILLIGRAM(S): 50 CAPSULE, EXTENDED RELEASE ORAL at 01:38

## 2020-07-05 RX ADMIN — OXYCODONE HYDROCHLORIDE 10 MILLIGRAM(S): 5 TABLET ORAL at 03:17

## 2020-07-05 RX ADMIN — Medication 400 MILLIGRAM(S): at 12:31

## 2020-07-05 RX ADMIN — Medication 1 TABLET(S): at 12:45

## 2020-07-05 RX ADMIN — Medication 400 MILLIGRAM(S): at 20:00
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
## 2020-07-05 NOTE — PROGRESS NOTE ADULT - SUBJECTIVE AND OBJECTIVE BOX
ACS DAILY PROGRESS NOTE:       SUBJECTIVE/ROS: Patient reports that testicular pain is improved and that he can urinate.   Denies nausea, vomiting, chest pain, shortness of breath    MEDICATIONS  (STANDING):  enoxaparin Injectable 40 milliGRAM(s) SubCutaneous daily  ibuprofen  Tablet. 400 milliGRAM(s) Oral every 6 hours  multivitamin 1 Tablet(s) Oral daily    MEDICATIONS  (PRN):  acetaminophen   Tablet .. 650 milliGRAM(s) Oral every 6 hours PRN Mild Pain (1 - 3)  acetaminophen   Tablet .. 975 milliGRAM(s) Oral every 8 hours PRN Temp greater or equal to 38C (100.4F), Mild Pain (1 - 3)  benzocaine 15 mG/menthol 3.6 mG (Sugar-Free) Lozenge 1 Lozenge Oral every 2 hours PRN Sore Throat  morphine  - Injectable 2 milliGRAM(s) IV Push every 4 hours PRN Breakthrough pain  ondansetron Injectable 4 milliGRAM(s) IV Push every 6 hours PRN Nausea and/or Vomiting  oxyCODONE    IR 10 milliGRAM(s) Oral every 4 hours PRN Severe Pain (7 - 10)  oxyCODONE    IR 5 milliGRAM(s) Oral every 4 hours PRN Moderate Pain (4 - 6)      OBJECTIVE:    Vital Signs Last 24 Hrs  T(C): 36.9 (05 Jul 2020 16:01), Max: 37.7 (04 Jul 2020 17:14)  T(F): 98.5 (05 Jul 2020 16:01), Max: 99.8 (04 Jul 2020 17:14)  HR: 80 (05 Jul 2020 16:01) (54 - 96)  BP: 126/67 (05 Jul 2020 16:01) (126/67 - 148/87)  BP(mean): --  RR: 18 (05 Jul 2020 16:01) (16 - 18)  SpO2: 98% (05 Jul 2020 16:01) (95% - 98%)    I&O's Detail    04 Jul 2020 07:01  -  05 Jul 2020 07:00  --------------------------------------------------------  IN:    dextrose 5% + sodium chloride 0.45% with potassium chloride 20 mEq/L: 750 mL    Oral Fluid: 1350 mL    sodium chloride 0.9%: 400 mL  Total IN: 2500 mL    OUT:    Voided: 2825 mL  Total OUT: 2825 mL    Total NET: -325 mL      05 Jul 2020 07:01  -  05 Jul 2020 16:06  --------------------------------------------------------  IN:    Oral Fluid: 1250 mL  Total IN: 1250 mL    OUT:    Voided: 400 mL  Total OUT: 400 mL    Total NET: 850 mL          Daily     Daily     LABS:                        10.0   8.36  )-----------( 186      ( 05 Jul 2020 06:27 )             28.6     07-05    137  |  101  |  11  ----------------------------<  122<H>  4.1   |  29  |  1.13    Ca    8.2<L>      05 Jul 2020 06:28  Phos  3.6     07-05  Mg     2.1     07-05      PT/INR - ( 04 Jul 2020 04:48 )   PT: 16.0 sec;   INR: 1.41 ratio                       PHYSICAL EXAM:  Constitutional: well developed, well nourished, NAD  Eyes: anicteric  ENMT: normal facies, symmetric  Neck: supple  Respiratory: CTA bilaterally  Cardiovascular: RRR  Gastrointestinal: abdomen soft, nontender, nondistended. No obvious masses.   Extremities:  warm  Neurological: intact, non-focal  Skin: no gross lesions  Psychiatric: oriented x 3; appropriate      Tacho Goodman MD

## 2020-07-05 NOTE — PHYSICAL THERAPY INITIAL EVALUATION ADULT - ADDITIONAL COMMENTS
Pt lives with his wife in a private home with 11 steps to enter. PTA pt was (I) with ADLs and functional mobility with no assistive device. Pt is employed as a . Pt lives with his wife in a private home with 11 steps to enter. PTA pt was (I) with ADLs and functional mobility with no assistive device. Pt is employed as a .      MRI R knee results cont: Full-thickness tear of the proximal aspect of the popliteus tendon. Nondisplaced fracture of the medial tibial spine. The fracture occurs at the posterior aspect of the insertion site of the ACL. Moderate partial tear of the proximal substance of the ACL. Multifocal bone contusions, as above. Moderate hemarthrosis.

## 2020-07-05 NOTE — PROGRESS NOTE ADULT - SUBJECTIVE AND OBJECTIVE BOX
No acute evenets overnight  Comfortable, no scrotal pain    O: Vital Signs Last 24 Hrs  T(C): 36.8 (05 Jul 2020 06:15), Max: 37.7 (04 Jul 2020 17:14)  T(F): 98.3 (05 Jul 2020 06:15), Max: 99.8 (04 Jul 2020 17:14)  HR: 68 (05 Jul 2020 08:25) (54 - 96)  BP: 142/80 (05 Jul 2020 08:25) (122/73 - 171/105)  BP(mean): 113 (04 Jul 2020 14:45) (107 - 126)  RR: 18 (05 Jul 2020 06:15) (16 - 18)  SpO2: 96% (05 Jul 2020 08:25) (92% - 100%)      04 Jul 2020 07:01  -  05 Jul 2020 07:00  --------------------------------------------------------  IN:    dextrose 5% + sodium chloride 0.45% with potassium chloride 20 mEq/L: 750 mL    Oral Fluid: 1350 mL    sodium chloride 0.9%: 400 mL  Total IN: 2500 mL    OUT:    Voided: 2825 mL  Total OUT: 2825 mL    Total NET: -325 mL          Physical Exam:    Gen: Well-developed, well-nourished in no acute distress  Resp: No additional work of breathing   GI: Soft, non-tender, non-distended, with normoactive bowel sounds.  No masses.  : Normal appearing phallus, scant drainage from removed penrose site, scortal incision well approximated.  No overlying skin changes  MSK: Moves all extremities equally  Skin: No rashes    Labs:                        10.0   8.36  )-----------( 186      ( 05 Jul 2020 06:27 )             28.6     05 Jul 2020 06:28    137    |  101    |  11     ----------------------------<  122    4.1     |  29     |  1.13     Ca    8.2        05 Jul 2020 06:28  Phos  3.6       05 Jul 2020 06:28  Mg     2.1       05 Jul 2020 06:28      PT/INR - ( 04 Jul 2020 04:48 )   PT: 16.0 sec;   INR: 1.41 ratio           CAPILLARY BLOOD GLUCOSE                    MEDICATIONS  (STANDING):  acetaminophen  IVPB .. 1000 milliGRAM(s) IV Intermittent once  enoxaparin Injectable 40 milliGRAM(s) SubCutaneous daily  ibuprofen  Tablet. 400 milliGRAM(s) Oral every 6 hours  multivitamin 1 Tablet(s) Oral daily    MEDICATIONS  (PRN):  acetaminophen   Tablet .. 650 milliGRAM(s) Oral every 6 hours PRN Mild Pain (1 - 3)  acetaminophen   Tablet .. 975 milliGRAM(s) Oral every 8 hours PRN Temp greater or equal to 38C (100.4F), Mild Pain (1 - 3)  benzocaine 15 mG/menthol 3.6 mG (Sugar-Free) Lozenge 1 Lozenge Oral every 2 hours PRN Sore Throat  morphine  - Injectable 2 milliGRAM(s) IV Push every 4 hours PRN Breakthrough pain  ondansetron Injectable 4 milliGRAM(s) IV Push every 6 hours PRN Nausea and/or Vomiting  oxyCODONE    IR 10 milliGRAM(s) Oral every 4 hours PRN Severe Pain (7 - 10)  oxyCODONE    IR 5 milliGRAM(s) Oral every 4 hours PRN Moderate Pain (4 - 6)

## 2020-07-05 NOTE — PROGRESS NOTE ADULT - ASSESSMENT
A/P: 39y Male s/p R orchiectomy and scrotal exploration for ruptured testicle    pain control  DVT prophylaxis/OOB  Incentive spirometry  Strict I&O's  continue scrotal support/supportive underwear  follow-up in approx 3 weeks  please call with further questions A/P: 39y Male s/p R orchiectomy and scrotal exploration for ruptured testicle    pain control  DVT prophylaxis/OOB  Incentive spirometry  continue scrotal support/supportive underwear  follow-up in approx 3 weeks  please call with further questions

## 2020-07-05 NOTE — PROGRESS NOTE ADULT - ASSESSMENT
39y Male patient S/P right orchiectomy and scrotal exploration for ruptured right testicle with hematocele.    Plan:  -f/u MRI  -PT/OT  - Pain control PRN  -Diet, advanced as tolerated  - DVT ppx: Lovenox 40 mg

## 2020-07-05 NOTE — PHYSICAL THERAPY INITIAL EVALUATION ADULT - CRITERIA FOR SKILLED THERAPEUTIC INTERVENTIONS
anticipated equipment needs at discharge/functional limitations in following categories/therapy frequency/rehab potential/anticipated discharge recommendation/impairments found
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
## 2020-07-05 NOTE — PROGRESS NOTE ADULT - SUBJECTIVE AND OBJECTIVE BOX
Post op Day [1]    Patient resting without complaints.  No chest pain, SOB, N/V.    T(C): 36.8 (07-05-20 @ 06:15), Max: 37.7 (07-04-20 @ 17:14)  HR: 54 (07-05-20 @ 06:15) (54 - 96)  BP: 146/86 (07-05-20 @ 06:15) (122/73 - 171/105)  RR: 18 (07-05-20 @ 06:15) (16 - 18)  SpO2: 96% (07-05-20 @ 06:15) (92% - 100%)      Exam:  Alert and Oriented, No Acute Distress  Upper extremity: R Wrist  Dressing: C/D/I w/ soft dressing and ace bandage  Forearm soft, no signs of compartment syndrome  Wiggles fingers  SILT  Normal blanching digits 1-5 <2 seconds                          10.4   7.47  )-----------( 187      ( 04 Jul 2020 04:48 )             30.7    07-05    137  |  101  |  11  ----------------------------<  122<H>  4.1   |  29  |  1.13    Ca    8.2<L>      05 Jul 2020 06:28  Phos  3.6     07-05  Mg     2.1     07-05        A/p: 39y Male s/p R distal radial.ulna Fx ORIF.     PT/OT-WBAT RUE with Platform walker.  F/U R Knee MRI Results  F/U AM Labs  IS  DVT PPx: Lovenox and SCDs  Pain Control  Continue Current Tx.    Abran Santos PA-C  Orthopedic Surgery Team  Team Pager: #[phone] Post op Day [1]    Patient resting without complaints.  No chest pain, SOB, N/V.    T(C): 36.8 (07-05-20 @ 06:15), Max: 37.7 (07-04-20 @ 17:14)  HR: 54 (07-05-20 @ 06:15) (54 - 96)  BP: 146/86 (07-05-20 @ 06:15) (122/73 - 171/105)  RR: 18 (07-05-20 @ 06:15) (16 - 18)  SpO2: 96% (07-05-20 @ 06:15) (92% - 100%)      Exam:  Alert and Oriented, No Acute Distress  Upper extremity: R Wrist  Dressing: C/D/I w/ soft dressing and ace bandage  Forearm soft, no signs of compartment syndrome  Wiggles fingers  SILT  Normal blanching digits 1-5 <2 seconds                          10.4   7.47  )-----------( 187      ( 04 Jul 2020 04:48 )             30.7    07-05    137  |  101  |  11  ----------------------------<  122<H>  4.1   |  29  |  1.13    Ca    8.2<L>      05 Jul 2020 06:28  Phos  3.6     07-05  Mg     2.1     07-05        A/p: 39y Male s/p R distal radial/ulna Fx ORIF. R Knee Partial LCL Tear(Non-Op). L Ankle Takus Avulsion Fx (Non-Op)    PT/OT-WBAT RUE with Platform walker, NWB R Knee in Bulky Richardson dressing and Knee Immobilizer, L Ankle WBAT in Lace up brace.  F/U Ankle Brace  IS  DVT PPx: Lovenox and SCDs  Pain Control  Patient cleared from orthopedic standpoint for discharge  Patient to follow up with Dr. Tao in office in 2 weeks    Abran Santos PA-C  Orthopedic Surgery Team  Team Pager: #[phone]

## 2020-07-06 ENCOUNTER — TRANSCRIPTION ENCOUNTER (OUTPATIENT)
Age: 40
End: 2020-07-06

## 2020-07-06 VITALS
DIASTOLIC BLOOD PRESSURE: 78 MMHG | SYSTOLIC BLOOD PRESSURE: 152 MMHG | TEMPERATURE: 98 F | OXYGEN SATURATION: 96 % | HEART RATE: 61 BPM | RESPIRATION RATE: 18 BRPM

## 2020-07-06 DIAGNOSIS — S52.501A UNSPECIFIED FRACTURE OF THE LOWER END OF RIGHT RADIUS, INITIAL ENCOUNTER FOR CLOSED FRACTURE: ICD-10-CM

## 2020-07-06 LAB
ANION GAP SERPL CALC-SCNC: 8 MMOL/L — SIGNIFICANT CHANGE UP (ref 5–17)
BUN SERPL-MCNC: 12 MG/DL — SIGNIFICANT CHANGE UP (ref 7–23)
CALCIUM SERPL-MCNC: 8.5 MG/DL — SIGNIFICANT CHANGE UP (ref 8.4–10.5)
CHLORIDE SERPL-SCNC: 101 MMOL/L — SIGNIFICANT CHANGE UP (ref 96–108)
CO2 SERPL-SCNC: 29 MMOL/L — SIGNIFICANT CHANGE UP (ref 22–31)
CREAT SERPL-MCNC: 1.07 MG/DL — SIGNIFICANT CHANGE UP (ref 0.5–1.3)
GLUCOSE SERPL-MCNC: 113 MG/DL — HIGH (ref 70–99)
HCT VFR BLD CALC: 30.4 % — LOW (ref 39–50)
HGB BLD-MCNC: 10.2 G/DL — LOW (ref 13–17)
MAGNESIUM SERPL-MCNC: 1.9 MG/DL — SIGNIFICANT CHANGE UP (ref 1.6–2.6)
MCHC RBC-ENTMCNC: 31.1 PG — SIGNIFICANT CHANGE UP (ref 27–34)
MCHC RBC-ENTMCNC: 33.6 GM/DL — SIGNIFICANT CHANGE UP (ref 32–36)
MCV RBC AUTO: 92.7 FL — SIGNIFICANT CHANGE UP (ref 80–100)
NRBC # BLD: 0 /100 WBCS — SIGNIFICANT CHANGE UP (ref 0–0)
PHOSPHATE SERPL-MCNC: 4.4 MG/DL — SIGNIFICANT CHANGE UP (ref 2.5–4.5)
PLATELET # BLD AUTO: 227 K/UL — SIGNIFICANT CHANGE UP (ref 150–400)
POTASSIUM SERPL-MCNC: 3.9 MMOL/L — SIGNIFICANT CHANGE UP (ref 3.5–5.3)
POTASSIUM SERPL-SCNC: 3.9 MMOL/L — SIGNIFICANT CHANGE UP (ref 3.5–5.3)
RBC # BLD: 3.28 M/UL — LOW (ref 4.2–5.8)
RBC # FLD: 12.2 % — SIGNIFICANT CHANGE UP (ref 10.3–14.5)
SODIUM SERPL-SCNC: 138 MMOL/L — SIGNIFICANT CHANGE UP (ref 135–145)
WBC # BLD: 5.91 K/UL — SIGNIFICANT CHANGE UP (ref 3.8–10.5)
WBC # FLD AUTO: 5.91 K/UL — SIGNIFICANT CHANGE UP (ref 3.8–10.5)

## 2020-07-06 PROCEDURE — 83735 ASSAY OF MAGNESIUM: CPT

## 2020-07-06 PROCEDURE — 83690 ASSAY OF LIPASE: CPT

## 2020-07-06 PROCEDURE — 84295 ASSAY OF SERUM SODIUM: CPT

## 2020-07-06 PROCEDURE — 71260 CT THORAX DX C+: CPT

## 2020-07-06 PROCEDURE — 72190 X-RAY EXAM OF PELVIS: CPT

## 2020-07-06 PROCEDURE — C9399: CPT

## 2020-07-06 PROCEDURE — 85014 HEMATOCRIT: CPT

## 2020-07-06 PROCEDURE — 82330 ASSAY OF CALCIUM: CPT

## 2020-07-06 PROCEDURE — 73060 X-RAY EXAM OF HUMERUS: CPT

## 2020-07-06 PROCEDURE — 85027 COMPLETE CBC AUTOMATED: CPT

## 2020-07-06 PROCEDURE — 86900 BLOOD TYPING SEROLOGIC ABO: CPT

## 2020-07-06 PROCEDURE — 73610 X-RAY EXAM OF ANKLE: CPT

## 2020-07-06 PROCEDURE — 82947 ASSAY GLUCOSE BLOOD QUANT: CPT

## 2020-07-06 PROCEDURE — 71045 X-RAY EXAM CHEST 1 VIEW: CPT

## 2020-07-06 PROCEDURE — 99231 SBSQ HOSP IP/OBS SF/LOW 25: CPT

## 2020-07-06 PROCEDURE — 73110 X-RAY EXAM OF WRIST: CPT

## 2020-07-06 PROCEDURE — 97530 THERAPEUTIC ACTIVITIES: CPT

## 2020-07-06 PROCEDURE — 85610 PROTHROMBIN TIME: CPT

## 2020-07-06 PROCEDURE — 73130 X-RAY EXAM OF HAND: CPT

## 2020-07-06 PROCEDURE — 82803 BLOOD GASES ANY COMBINATION: CPT

## 2020-07-06 PROCEDURE — 74177 CT ABD & PELVIS W/CONTRAST: CPT

## 2020-07-06 PROCEDURE — C1713: CPT

## 2020-07-06 PROCEDURE — 73030 X-RAY EXAM OF SHOULDER: CPT

## 2020-07-06 PROCEDURE — 88305 TISSUE EXAM BY PATHOLOGIST: CPT

## 2020-07-06 PROCEDURE — 73080 X-RAY EXAM OF ELBOW: CPT

## 2020-07-06 PROCEDURE — 80048 BASIC METABOLIC PNL TOTAL CA: CPT

## 2020-07-06 PROCEDURE — 86901 BLOOD TYPING SEROLOGIC RH(D): CPT

## 2020-07-06 PROCEDURE — 97165 OT EVAL LOW COMPLEX 30 MIN: CPT

## 2020-07-06 PROCEDURE — 72125 CT NECK SPINE W/O DYE: CPT

## 2020-07-06 PROCEDURE — 82435 ASSAY OF BLOOD CHLORIDE: CPT

## 2020-07-06 PROCEDURE — 97162 PT EVAL MOD COMPLEX 30 MIN: CPT

## 2020-07-06 PROCEDURE — 85730 THROMBOPLASTIN TIME PARTIAL: CPT

## 2020-07-06 PROCEDURE — C1889: CPT

## 2020-07-06 PROCEDURE — 97116 GAIT TRAINING THERAPY: CPT

## 2020-07-06 PROCEDURE — 70486 CT MAXILLOFACIAL W/O DYE: CPT

## 2020-07-06 PROCEDURE — 83605 ASSAY OF LACTIC ACID: CPT

## 2020-07-06 PROCEDURE — 73721 MRI JNT OF LWR EXTRE W/O DYE: CPT

## 2020-07-06 PROCEDURE — 76377 3D RENDER W/INTRP POSTPROCES: CPT

## 2020-07-06 PROCEDURE — 81001 URINALYSIS AUTO W/SCOPE: CPT

## 2020-07-06 PROCEDURE — 73090 X-RAY EXAM OF FOREARM: CPT

## 2020-07-06 PROCEDURE — 73700 CT LOWER EXTREMITY W/O DYE: CPT

## 2020-07-06 PROCEDURE — 86850 RBC ANTIBODY SCREEN: CPT

## 2020-07-06 PROCEDURE — 73564 X-RAY EXAM KNEE 4 OR MORE: CPT

## 2020-07-06 PROCEDURE — 76870 US EXAM SCROTUM: CPT

## 2020-07-06 PROCEDURE — 73100 X-RAY EXAM OF WRIST: CPT

## 2020-07-06 PROCEDURE — 76000 FLUOROSCOPY <1 HR PHYS/QHP: CPT

## 2020-07-06 PROCEDURE — 70450 CT HEAD/BRAIN W/O DYE: CPT

## 2020-07-06 PROCEDURE — 73552 X-RAY EXAM OF FEMUR 2/>: CPT

## 2020-07-06 PROCEDURE — 80307 DRUG TEST PRSMV CHEM ANLYZR: CPT

## 2020-07-06 PROCEDURE — 80053 COMPREHEN METABOLIC PANEL: CPT

## 2020-07-06 PROCEDURE — 84132 ASSAY OF SERUM POTASSIUM: CPT

## 2020-07-06 PROCEDURE — 73590 X-RAY EXAM OF LOWER LEG: CPT

## 2020-07-06 PROCEDURE — 84100 ASSAY OF PHOSPHORUS: CPT

## 2020-07-06 RX ORDER — ACETAMINOPHEN 500 MG
3 TABLET ORAL
Qty: 0 | Refills: 0 | DISCHARGE
Start: 2020-07-06

## 2020-07-06 RX ORDER — IBUPROFEN 200 MG
1 TABLET ORAL
Qty: 0 | Refills: 0 | DISCHARGE
Start: 2020-07-06

## 2020-07-06 RX ORDER — OXYCODONE HYDROCHLORIDE 5 MG/1
1 TABLET ORAL
Qty: 20 | Refills: 0
Start: 2020-07-06

## 2020-07-06 RX ADMIN — ENOXAPARIN SODIUM 40 MILLIGRAM(S): 100 INJECTION SUBCUTANEOUS at 12:03

## 2020-07-06 RX ADMIN — Medication 400 MILLIGRAM(S): at 08:49

## 2020-07-06 RX ADMIN — Medication 1 TABLET(S): at 12:03

## 2020-07-06 RX ADMIN — OXYCODONE HYDROCHLORIDE 10 MILLIGRAM(S): 5 TABLET ORAL at 12:03

## 2020-07-06 RX ADMIN — Medication 400 MILLIGRAM(S): at 14:49

## 2020-07-06 RX ADMIN — OXYCODONE HYDROCHLORIDE 10 MILLIGRAM(S): 5 TABLET ORAL at 05:05

## 2020-07-06 RX ADMIN — OXYCODONE HYDROCHLORIDE 10 MILLIGRAM(S): 5 TABLET ORAL at 16:28

## 2020-07-06 NOTE — DISCHARGE NOTE NURSING/CASE MANAGEMENT/SOCIAL WORK - PATIENT PORTAL LINK FT
You can access the FollowMyHealth Patient Portal offered by [facility] by registering at the following website: [email or web address]. By joining [employer]’s FollowMyHealth portal, you will also be able to view your health information using other applications (apps) compatible with our system.

## 2020-07-06 NOTE — OCCUPATIONAL THERAPY INITIAL EVALUATION ADULT - ADDITIONAL COMMENTS
MR Knee (7/4): Full-thickness tear of the fibular collateral ligament and adjacent posterolateral capsule with marked lateral displacement of the proximal stump of the ligament. Adjacent hematoma and associated tearing/lateral bowing of the iliotibial band.  Full-thickness tear of the proximal aspect of the popliteus tendon.  Nondisplaced fracture of the medial tibial spine. The fracture occurs at the posterior aspect of the insertion site of the ACL. Moderate partial tear of the proximal substance of the ACL.  XRay Wrist (7/4): Redemonstration of acute comminuted fracture intra-articular of the right distal radius status post reduction and casting with improved alignment. Redemonstrated ulnar styloid fracture

## 2020-07-06 NOTE — DISCHARGE NOTE PROVIDER - CARE PROVIDER_API CALL
Artis Baum)  Urology  [address] [place] [address] [address]  [place] [address]  Phone: [phone]  Fax: [phone]  Follow Up Time: 2 weeks    Red Tao  ORTHOPAEDIC SURGERY  [address] [place] [id number]  Phone: [phone]  Fax: [phone]  Follow Up Time: 2 weeks

## 2020-07-06 NOTE — OCCUPATIONAL THERAPY INITIAL EVALUATION ADULT - LEVEL OF INDEPENDENCE: DRESS LOWER BODY, OT EVAL
maximum assist (25% patients effort)/Pt able to don/doff R sock however requires max A for L LE 2* decreased ROM, Knee brace

## 2020-07-06 NOTE — OCCUPATIONAL THERAPY INITIAL EVALUATION ADULT - RANGE OF MOTION EXAMINATION, LOWER EXTREMITY
Left LE Active ROM was WFL (within functional limits)/R LE Knee NWB Left LE Active ROM was WFL (within functional limits)/R LE Knee in knee brace, NWB

## 2020-07-06 NOTE — PROGRESS NOTE ADULT - PROVIDER SPECIALTY LIST ADULT
Orthopedics
Urology
Surgery
Trauma Surgery
Urology
Trauma Surgery
Urology
Orthopedics
Orthopedics

## 2020-07-06 NOTE — DISCHARGE NOTE PROVIDER - NSDCCPCAREPLAN_GEN_ALL_CORE_FT
PRINCIPAL DISCHARGE DIAGNOSIS  Diagnosis: Rupture of testis, initial encounter  Assessment and Plan of Treatment: Continue scrotal support/supportive underwear  Follow-up with Dr. Baum in approx 3 weeks. please call to make an appointment.  Please follow up with your Trauma Doctor only if needed at [address] [place] [address] , phone #[phone].      SECONDARY DISCHARGE DIAGNOSES  Diagnosis: Distal radius fracture  Assessment and Plan of Treatment: - Left Lower extremity: Weight bearing as tolerated in ankle laceup brace  - Right Lower extremity: NON-Weight bearing RLE in knee immobilizer.  MRI reviewed  Pt will likely need PLC reconstruction as out patient.  - Right Upper extremity: Weight bearing as tolerated RUE through platform (elbow), NON-Weight bearing through hand/wrist  - Patient to follow up with Dr. Tao in office in 2 weeks PRINCIPAL DISCHARGE DIAGNOSIS  Diagnosis: Rupture of testis, initial encounter  Assessment and Plan of Treatment: Continue scrotal support/supportive underwear  Follow-up with Dr. Baum in approx 3 weeks. please call to make an appointment.  Follow up with your PMD within 1 week regarding your hospitalization.  Please follow up with your Trauma Doctor only if needed at [address] [place] [address] , phone #[phone].      SECONDARY DISCHARGE DIAGNOSES  Diagnosis: Distal radius fracture  Assessment and Plan of Treatment: - Left Lower extremity: Weight bearing as tolerated in ankle laceup brace  - Right Lower extremity: NON-Weight bearing RLE in knee immobilizer.  MRI reviewed  Pt will likely need PLC reconstruction as out patient.  - Right Upper extremity: Weight bearing as tolerated RUE through platform (elbow), NON-Weight bearing through hand/wrist  - Patient to follow up with Dr. Tao in office in 2 weeks PRINCIPAL DISCHARGE DIAGNOSIS  Diagnosis: Rupture of testis, initial encounter  Assessment and Plan of Treatment: Continue scrotal support/supportive underwear  Follow-up with Dr. Baum in approx 3 weeks. please call to make an appointment.  Follow up with your regular medical within 1 week regarding your hospitalization.  Please follow up with your Trauma Doctor only if needed at [address] [place] [address] , phone #[phone].      SECONDARY DISCHARGE DIAGNOSES  Diagnosis: Distal radius fracture  Assessment and Plan of Treatment: - Left Lower extremity: Weight bearing as tolerated in ankle laceup brace  - Right Lower extremity: NON-Weight bearing wear knee immobilizer.  MRI reviewed  Pt will likely need PLC reconstruction as out patient.  - Right Upper extremity: Weight bearing as tolerated right upper extremity through platform (elbow), NON-Weight bearing through hand/wrist  - Patient to follow up with Dr. Tao in office in 2 weeks, call to schedule an appointment

## 2020-07-06 NOTE — DISCHARGE NOTE PROVIDER - CARE PROVIDERS DIRECT ADDRESSES
[email or web address]

## 2020-07-06 NOTE — PROGRESS NOTE ADULT - PROBLEM SELECTOR PROBLEM 1
Closed fracture of distal end of right radius, unspecified fracture morphology, initial encounter
Distal radius fracture

## 2020-07-06 NOTE — PROGRESS NOTE ADULT - PROBLEM SELECTOR PLAN 1
PT/OT-WBAT LLE in ankle laceup brace, NWB RLE in BJKI, WBAT RUE through platform (elbow), NWB through hand/wrist RUE  IS  DVT PPx  Pain Control  Continue Current Tx.  Care per primary team    Rory Bustillo PA-C  Team Pager: #[phone]

## 2020-07-06 NOTE — PROGRESS NOTE ADULT - SUBJECTIVE AND OBJECTIVE BOX
Rory was awake and alert in bed today as I measured and applied one large lace up ankle brace to his left ankle foot, over a tibial length sock for skin protection. I answered all his questions , and provided printed instructions,and one additional sock.  [place] [phone]
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
## 2020-07-06 NOTE — OCCUPATIONAL THERAPY INITIAL EVALUATION ADULT - ASR WT BEARING STATUS EVAL
Right UE/Right LE/ORIF, 2-part fracture, radius, distal, intra-articular Right LE/Right UE/s/p ORIF, 2-part fracture, radius, distal, intra-articular

## 2020-07-06 NOTE — PROGRESS NOTE ADULT - SUBJECTIVE AND OBJECTIVE BOX
Post op Day [ 2]    Patient resting without complaints.  No chest pain, SOB, N/V.    T(C): 36.8 (07-06-20 @ 04:51), Max: 37.2 (07-05-20 @ 21:33)  HR: 60 (07-06-20 @ 04:51) (60 - 80)  BP: 149/87 (07-06-20 @ 04:51) (126/67 - 151/79)  RR: 18 (07-06-20 @ 04:51) (18 - 18)  SpO2: 95% (07-06-20 @ 04:51) (95% - 98%)  Wt(kg): --    Exam:  Alert and Oriented, No Acute Distress  RUE in sling/splint, soft/compressible compartments.  Appropriate mild swelling/ecchymosis.  Digits pink, warm, mobile with SILT, brisk cap refill <2 seconds  RLE in BJKI, soft/compressible compartments  LLE Soft/compressible compartments  Calves Soft, Non-tender bilaterally  Bilateral ankle ecchymosis/edema R>L  +PF/DF/EHL/FHL bilat  SILT bilat  +DP Pulses                  10.2   5.91  )-----------( 227      ( 06 Jul 2020 05:54 )             30.4    07-06    138  |  101  |  12  ----------------------------<  113<H>  3.9   |  29  |  1.07    Ca    8.5      06 Jul 2020 05:54  Phos  4.4     07-06  Mg     1.9     07-06

## 2020-07-06 NOTE — DISCHARGE NOTE PROVIDER - NSDCCPTREATMENT_GEN_ALL_CORE_FT
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

## 2020-07-06 NOTE — PROGRESS NOTE ADULT - SUBJECTIVE AND OBJECTIVE BOX
SURGERY DAILY PROGRESS NOTE:       SUBJECTIVE/ROS: Patient reports no complaints overnight.  Denies nausea, vomiting, chest pain, shortness of breath         MEDICATIONS  (STANDING):  enoxaparin Injectable 40 milliGRAM(s) SubCutaneous daily  ibuprofen  Tablet. 400 milliGRAM(s) Oral every 6 hours  multivitamin 1 Tablet(s) Oral daily    MEDICATIONS  (PRN):  acetaminophen   Tablet .. 650 milliGRAM(s) Oral every 6 hours PRN Mild Pain (1 - 3)  acetaminophen   Tablet .. 975 milliGRAM(s) Oral every 8 hours PRN Temp greater or equal to 38C (100.4F), Mild Pain (1 - 3)  benzocaine 15 mG/menthol 3.6 mG (Sugar-Free) Lozenge 1 Lozenge Oral every 2 hours PRN Sore Throat  morphine  - Injectable 2 milliGRAM(s) IV Push every 4 hours PRN Breakthrough pain  ondansetron Injectable 4 milliGRAM(s) IV Push every 6 hours PRN Nausea and/or Vomiting  oxyCODONE    IR 10 milliGRAM(s) Oral every 4 hours PRN Severe Pain (7 - 10)  oxyCODONE    IR 5 milliGRAM(s) Oral every 4 hours PRN Moderate Pain (4 - 6)      OBJECTIVE:    Vital Signs Last 24 Hrs  T(C): 36.8 (06 Jul 2020 04:51), Max: 37.2 (05 Jul 2020 21:33)  T(F): 98.3 (06 Jul 2020 04:51), Max: 98.9 (05 Jul 2020 21:33)  HR: 60 (06 Jul 2020 04:51) (60 - 80)  BP: 149/87 (06 Jul 2020 04:51) (126/67 - 151/79)  BP(mean): --  RR: 18 (06 Jul 2020 04:51) (18 - 18)  SpO2: 95% (06 Jul 2020 04:51) (95% - 98%)        I&O's Detail    05 Jul 2020 07:01  -  06 Jul 2020 07:00  --------------------------------------------------------  IN:    Oral Fluid: 2550 mL  Total IN: 2550 mL    OUT:    Voided: 1800 mL  Total OUT: 1800 mL    Total NET: 750 mL          Daily     Daily     LABS:                        10.2   5.91  )-----------( 227      ( 06 Jul 2020 05:54 )             30.4     07-06    138  |  101  |  12  ----------------------------<  113<H>  3.9   |  29  |  1.07    Ca    8.5      06 Jul 2020 05:54  Phos  4.4     07-06  Mg     1.9     07-06                    PHYSICAL EXAM:  Constitutional: well developed, well nourished, NAD  Eyes: anicteric  ENMT: normal facies, symmetric  Neck: supple  Extremities: R wrist in splint, R leg in cast  Neurological: intact, non-focal  Skin: no gross lesions  Lymph Nodes: no gross adenopathy  Psychiatric: oriented x 3; appropriate

## 2020-07-06 NOTE — OCCUPATIONAL THERAPY INITIAL EVALUATION ADULT - MANUAL MUSCLE TESTING RESULTS, REHAB EVAL
L UE 4/5, L LE 4/5, R UE/LE not tested 2* NWB precautions L HUMBERTO 4/5, L LUIS 4/5, R UE/LUIS 2* NWB

## 2020-07-06 NOTE — OCCUPATIONAL THERAPY INITIAL EVALUATION ADULT - LIVES WITH, PROFILE
Pt lives with family in house on second floor. Pt at baseline is ind for ADLs and functional mobility./spouse/children

## 2020-07-06 NOTE — OCCUPATIONAL THERAPY INITIAL EVALUATION ADULT - LIGHT TOUCH SENSATION, RUE, REHAB EVAL
Detail Level: Detailed Add 44639 Cpt? (Important Note: In 2017 The Use Of 13406 Is Being Tracked By Cms To Determine Future Global Period Reimbursement For Global Periods): yes within normal limits

## 2020-07-06 NOTE — CHART NOTE - NSCHARTNOTEFT_GEN_A_CORE
Due to patients deconditioning and overall weakness secondary to distal radius facture NWB through hand/wrist RUE, Nondisplaced fracture of the medial tibial spine- NWB RLE. Pt will require a transport chair. Patient is unable to propel in standard wheelchair. This is necessary to achieve daily tasks and therapies which cannot be achieved with the use of cane or rolling walker. patient and family are in agreement with transport chair use and assistance will be provided if needed/     ACS 9039

## 2020-07-06 NOTE — DISCHARGE NOTE PROVIDER - PROVIDER TOKENS
PROVIDER:[TOKEN:[[id number]],FOLLOWUP:[2 weeks]],PROVIDER:[TOKEN:[[id number]],FOLLOWUP:[2 weeks]]

## 2020-07-06 NOTE — OCCUPATIONAL THERAPY INITIAL EVALUATION ADULT - DIAGNOSIS, OT EVAL
Pt demonstrates decrease in balance, strength, ROM, and activity tolerance affecting ADLs and functional mobility.

## 2020-07-06 NOTE — OCCUPATIONAL THERAPY INITIAL EVALUATION ADULT - NS ASR WT BEARING DETAIL RUE
nonweight-bearing nonweight-bearing/R UE- WBAT through elbow (okay to use platform walker as per ortho)

## 2020-07-06 NOTE — DISCHARGE NOTE PROVIDER - NSDCMRMEDTOKEN_GEN_ALL_CORE_FT
acetaminophen 325 mg oral tablet: 3 tab(s) orally every 8 hours, As needed, Temp greater or equal to 38C (100.4F), Mild Pain (1 - 3)  ibuprofen 400 mg oral tablet: 1 tab(s) orally every 6 hours  L Ankle Lace Up brace: Dx: R Ankle Talus Avulsion Fx  JILL: 99M  Multiple Vitamins oral tablet: 1 tab(s) orally once a day  platform walker :   transport wheelchair:   tub transport bench : acetaminophen 325 mg oral tablet: 3 tab(s) orally every 8 hours, As needed, Temp greater or equal to 38C (100.4F), Mild Pain (1 - 3)  ibuprofen 400 mg oral tablet: 1 tab(s) orally every 6 hours  L Ankle Lace Up brace: Dx: R Ankle Talus Avulsion Fx  JILL: 99M  Multiple Vitamins oral tablet: 1 tab(s) orally once a day  oxyCODONE 5 mg oral tablet: 1 tab(s) orally every 6 hours, As Needed -Moderate to severe Pain (4 - 6) MDD:4  platform walker :   transport wheelchair:   tub transport bench :
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
## 2020-07-06 NOTE — PROGRESS NOTE ADULT - ATTENDING COMMENTS
Pt seen and examined.  Exam and plan as above.  Can WB RUE w Plat form walker.  MRI reviewed  Pt will likely need PLC reconstruction as out patietn.  knee immobilizer.  NWB RLE for now.  F/u next week.  [phone]. Ok for ORtho DC
patient was seen and examined, scrotal hematoma significantly reduced, drain was removed yesterday. Post-op care was discussed. He will follow with us as outpatient.
Pt seen and examined.  Chart reviewed.  Resident note confirmed.  Pt is a 39 male with no significant medical history who presented in transfer from [facility] after [facility] [place] [place]. Pt sustained a right wrist fx, right testicular rupture and a partial tear of the right ACL. Pt is s/p right orchiectomy and ORIF of the right wrist. No acute events overngiht.    Continue pain control  Reg diet  Planning for outpatient urology follow up  PT evaluation  discharge planning.
For ORIF distal radius today.  R/B/A discussed
Pt seen and examined.  Exam and plan as above
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
## 2020-07-06 NOTE — OCCUPATIONAL THERAPY INITIAL EVALUATION ADULT - RANGE OF MOTION EXAMINATION, UPPER EXTREMITY
Left UE Active ROM was WFL (within functional limits)/R UE wrist is NWB, Elbow and shoulder active ROM WFL

## 2020-07-08 PROBLEM — Z00.00 ENCOUNTER FOR PREVENTIVE HEALTH EXAMINATION: Status: ACTIVE | Noted: 2020-07-08

## 2020-07-08 LAB — SURGICAL PATHOLOGY STUDY: SIGNIFICANT CHANGE UP

## 2020-07-21 ENCOUNTER — APPOINTMENT (OUTPATIENT)
Dept: ORTHOPEDIC SURGERY | Facility: CLINIC | Age: 40
End: 2020-07-21
Payer: MEDICAID

## 2020-07-21 VITALS — TEMPERATURE: 98 F

## 2020-07-21 VITALS — DIASTOLIC BLOOD PRESSURE: 92 MMHG | SYSTOLIC BLOOD PRESSURE: 141 MMHG | HEART RATE: 62 BPM

## 2020-07-21 DIAGNOSIS — Z78.9 OTHER SPECIFIED HEALTH STATUS: ICD-10-CM

## 2020-07-21 DIAGNOSIS — Z72.3 LACK OF PHYSICAL EXERCISE: ICD-10-CM

## 2020-07-21 PROBLEM — I10 ESSENTIAL (PRIMARY) HYPERTENSION: Chronic | Status: ACTIVE | Noted: 2020-07-03

## 2020-07-21 PROCEDURE — 99024 POSTOP FOLLOW-UP VISIT: CPT

## 2020-07-21 RX ORDER — IBUPROFEN 800 MG/1
800 TABLET, FILM COATED ORAL EVERY 6 HOURS
Qty: 80 | Refills: 1 | Status: ACTIVE | COMMUNITY
Start: 2020-07-21 | End: 1900-01-01

## 2020-07-21 RX ORDER — TRAMADOL HYDROCHLORIDE 50 MG/1
50 TABLET, COATED ORAL 4 TIMES DAILY
Qty: 40 | Refills: 0 | Status: ACTIVE | COMMUNITY
Start: 2020-07-21 | End: 1900-01-01

## 2020-07-23 ENCOUNTER — APPOINTMENT (OUTPATIENT)
Dept: UROLOGY | Facility: CLINIC | Age: 40
End: 2020-07-23
Payer: MEDICAID

## 2020-07-23 VITALS
TEMPERATURE: 98.1 F | DIASTOLIC BLOOD PRESSURE: 96 MMHG | RESPIRATION RATE: 15 BRPM | SYSTOLIC BLOOD PRESSURE: 152 MMHG | HEIGHT: 71 IN | WEIGHT: 190 LBS | BODY MASS INDEX: 26.6 KG/M2 | HEART RATE: 59 BPM | OXYGEN SATURATION: 97 %

## 2020-07-23 DIAGNOSIS — S38.02XA: ICD-10-CM

## 2020-07-23 DIAGNOSIS — Z78.9 OTHER SPECIFIED HEALTH STATUS: ICD-10-CM

## 2020-07-23 PROCEDURE — 99024 POSTOP FOLLOW-UP VISIT: CPT

## 2020-07-23 RX ORDER — IBUPROFEN 600 MG/1
600 TABLET, FILM COATED ORAL
Qty: 100 | Refills: 0 | Status: ACTIVE | COMMUNITY
Start: 2020-02-28

## 2020-07-23 RX ORDER — OXYCODONE 5 MG/1
5 TABLET ORAL
Qty: 20 | Refills: 0 | Status: ACTIVE | COMMUNITY
Start: 2020-07-06

## 2020-07-23 NOTE — PHYSICAL EXAM
[General Appearance - Well Developed] : well developed [Normal Appearance] : normal appearance [General Appearance - Well Nourished] : well nourished [Well Groomed] : well groomed [General Appearance - In No Acute Distress] : no acute distress [Edema] : no peripheral edema [Exaggerated Use Of Accessory Muscles For Inspiration] : no accessory muscle use [] : no respiratory distress [Respiration, Rhythm And Depth] : normal respiratory rhythm and effort [Penis Abnormality] : normal circumcised penis [FreeTextEntry1] : Left testis normal, right scrotal incision with minimal serosanguineous drainage

## 2020-07-23 NOTE — ASSESSMENT
[FreeTextEntry1] : Postoperative care was reviewed. Hopefully soon his incision will close and the drainage will stop. He can follow up in the future if needed.
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
## 2020-07-31 ENCOUNTER — APPOINTMENT (OUTPATIENT)
Dept: ORTHOPEDIC SURGERY | Facility: CLINIC | Age: 40
End: 2020-07-31
Payer: MEDICAID

## 2020-07-31 VITALS
WEIGHT: 190 LBS | BODY MASS INDEX: 26.6 KG/M2 | DIASTOLIC BLOOD PRESSURE: 94 MMHG | TEMPERATURE: 97.9 F | SYSTOLIC BLOOD PRESSURE: 147 MMHG | HEIGHT: 71 IN | HEART RATE: 64 BPM

## 2020-07-31 PROCEDURE — 99214 OFFICE O/P EST MOD 30 MIN: CPT

## 2020-07-31 PROCEDURE — 73562 X-RAY EXAM OF KNEE 3: CPT | Mod: RT

## 2020-08-26 ENCOUNTER — APPOINTMENT (OUTPATIENT)
Dept: ORTHOPEDIC SURGERY | Facility: CLINIC | Age: 40
End: 2020-08-26
Payer: MEDICAID

## 2020-08-26 VITALS — TEMPERATURE: 98 F

## 2020-08-26 DIAGNOSIS — S82.114D: ICD-10-CM

## 2020-08-26 PROCEDURE — 99213 OFFICE O/P EST LOW 20 MIN: CPT | Mod: 24

## 2020-08-26 NOTE — DISCUSSION/SUMMARY
[de-identified] : Patient essentially had a multi-ligament injury to his right knee involving the ACL and the lateral posterolateral complex. His knee remains swollen and stiff at this time. I have recommended additional physical therapy at this time to help improve range of motion and and lower extremity strength. Weightbearing can be as tolerated. Recommend weightbearing with the knee brace for support. Brace was set for 0-120° knee flexion today. I would like to see him back for a checkup in 3-4 weeks. He will be a candidate for a right knee lateral collateral ligament, posterior lateral corner reconstruction with possible ACL reconstruction as well.
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
## 2020-08-26 NOTE — PHYSICAL EXAM
[LE] : Sensory: Intact in bilateral lower extremities [DP] : dorsalis pedis 2+ and symmetric bilaterally [PT] : posterior tibial 2+ and symmetric bilaterally [Normal LLE] : Left Lower Extremity: No scars, rashes, lesions, ulcers, skin intact [Normal RLE] : Right Lower Extremity: No scars, rashes, lesions, ulcers, skin intact [Normal] : Oriented to person, place, and time, insight and judgement were intact and the affect was normal [de-identified] : Right lower extremity immobilized in a hinged knee brace. Diffuse swelling right knee with focal tenderness laterally.  Right knee active range of motion 5-90° knee flexion with a heel slide.\par He can actively dorsiflex, plantarflex, invert and joselin both feet/ankles against manual resistance to

## 2020-09-02 ENCOUNTER — APPOINTMENT (OUTPATIENT)
Dept: ORTHOPEDIC SURGERY | Facility: CLINIC | Age: 40
End: 2020-09-02
Payer: MEDICAID

## 2020-09-02 VITALS — TEMPERATURE: 98.1 F

## 2020-09-02 PROCEDURE — 99024 POSTOP FOLLOW-UP VISIT: CPT

## 2020-09-02 PROCEDURE — 73110 X-RAY EXAM OF WRIST: CPT | Mod: RT

## 2020-09-16 ENCOUNTER — APPOINTMENT (OUTPATIENT)
Dept: ORTHOPEDIC SURGERY | Facility: CLINIC | Age: 40
End: 2020-09-16
Payer: MEDICAID

## 2020-09-16 VITALS
HEART RATE: 71 BPM | TEMPERATURE: 97.2 F | BODY MASS INDEX: 27.16 KG/M2 | SYSTOLIC BLOOD PRESSURE: 165 MMHG | DIASTOLIC BLOOD PRESSURE: 86 MMHG | HEIGHT: 71 IN | WEIGHT: 194 LBS

## 2020-09-16 PROCEDURE — 99024 POSTOP FOLLOW-UP VISIT: CPT

## 2020-09-16 PROCEDURE — 99213 OFFICE O/P EST LOW 20 MIN: CPT

## 2020-10-09 ENCOUNTER — OUTPATIENT (OUTPATIENT)
Dept: OUTPATIENT SERVICES | Facility: HOSPITAL | Age: 40
LOS: 1 days | End: 2020-10-09

## 2020-10-09 ENCOUNTER — APPOINTMENT (OUTPATIENT)
Dept: DISASTER EMERGENCY | Facility: CLINIC | Age: 40
End: 2020-10-09

## 2020-10-09 VITALS
WEIGHT: 197.09 LBS | DIASTOLIC BLOOD PRESSURE: 86 MMHG | HEART RATE: 72 BPM | RESPIRATION RATE: 18 BRPM | SYSTOLIC BLOOD PRESSURE: 140 MMHG | HEIGHT: 70.5 IN | TEMPERATURE: 98 F

## 2020-10-09 DIAGNOSIS — I10 ESSENTIAL (PRIMARY) HYPERTENSION: ICD-10-CM

## 2020-10-09 DIAGNOSIS — Z90.79 ACQUIRED ABSENCE OF OTHER GENITAL ORGAN(S): Chronic | ICD-10-CM

## 2020-10-09 DIAGNOSIS — T78.40XA ALLERGY, UNSPECIFIED, INITIAL ENCOUNTER: ICD-10-CM

## 2020-10-09 DIAGNOSIS — S83.429D: ICD-10-CM

## 2020-10-09 DIAGNOSIS — Z01.818 ENCOUNTER FOR OTHER PREPROCEDURAL EXAMINATION: ICD-10-CM

## 2020-10-09 DIAGNOSIS — S83.429A SPRAIN OF LATERAL COLLATERAL LIGAMENT OF UNSPECIFIED KNEE, INITIAL ENCOUNTER: ICD-10-CM

## 2020-10-09 DIAGNOSIS — Z98.890 OTHER SPECIFIED POSTPROCEDURAL STATES: Chronic | ICD-10-CM

## 2020-10-09 LAB
ANION GAP SERPL CALC-SCNC: 13 MMO/L — SIGNIFICANT CHANGE UP (ref 7–14)
BUN SERPL-MCNC: 8 MG/DL — SIGNIFICANT CHANGE UP (ref 7–23)
CALCIUM SERPL-MCNC: 9.6 MG/DL — SIGNIFICANT CHANGE UP (ref 8.4–10.5)
CHLORIDE SERPL-SCNC: 99 MMOL/L — SIGNIFICANT CHANGE UP (ref 98–107)
CO2 SERPL-SCNC: 27 MMOL/L — SIGNIFICANT CHANGE UP (ref 22–31)
CREAT SERPL-MCNC: 0.95 MG/DL — SIGNIFICANT CHANGE UP (ref 0.5–1.3)
GLUCOSE SERPL-MCNC: 101 MG/DL — HIGH (ref 70–99)
HCT VFR BLD CALC: 53.6 % — HIGH (ref 39–50)
HGB BLD-MCNC: 17.2 G/DL — HIGH (ref 13–17)
MCHC RBC-ENTMCNC: 29 PG — SIGNIFICANT CHANGE UP (ref 27–34)
MCHC RBC-ENTMCNC: 32.1 % — SIGNIFICANT CHANGE UP (ref 32–36)
MCV RBC AUTO: 90.4 FL — SIGNIFICANT CHANGE UP (ref 80–100)
NRBC # FLD: 0 K/UL — SIGNIFICANT CHANGE UP (ref 0–0)
PLATELET # BLD AUTO: 276 K/UL — SIGNIFICANT CHANGE UP (ref 150–400)
PMV BLD: 9.2 FL — SIGNIFICANT CHANGE UP (ref 7–13)
POTASSIUM SERPL-MCNC: 4.2 MMOL/L — SIGNIFICANT CHANGE UP (ref 3.5–5.3)
POTASSIUM SERPL-SCNC: 4.2 MMOL/L — SIGNIFICANT CHANGE UP (ref 3.5–5.3)
RBC # BLD: 5.93 M/UL — HIGH (ref 4.2–5.8)
RBC # FLD: 13.5 % — SIGNIFICANT CHANGE UP (ref 10.3–14.5)
SODIUM SERPL-SCNC: 139 MMOL/L — SIGNIFICANT CHANGE UP (ref 135–145)
WBC # BLD: 4.17 K/UL — SIGNIFICANT CHANGE UP (ref 3.8–10.5)
WBC # FLD AUTO: 4.17 K/UL — SIGNIFICANT CHANGE UP (ref 3.8–10.5)

## 2020-10-09 NOTE — H&P PST ADULT - RS GEN PE MLT RESP DETAILS PC
breath sounds equal/clear to auscultation bilaterally/no wheezes/good air movement/respirations non-labored/airway patent

## 2020-10-09 NOTE — H&P PST ADULT - ASSESSMENT
39 yo male with history of HTN presents to PST unit with pre-op diagnosis of sprain of lateral collateral ligament of unspecified knee, sprain of anterior cruciate ligament of right knee scheduled for right knee arthroscopy, open lateral, collateral, ligament reconstruction, posterolateral corner reconstruction semitendinosis allograft anterior cruciate ligament reconstruction with hamstring autograft with Dr. Walters.
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
## 2020-10-09 NOTE — H&P PST ADULT - EXTREMITIES
Medication refill request   Please call me when medication is sent to the pharmacy 
addressed
detailed exam

## 2020-10-09 NOTE — H&P PST ADULT - NSICDXPROBLEM_GEN_ALL_CORE_FT
PROBLEM DIAGNOSES  Problem: Lateral collateral ligament sprain of knee  Assessment and Plan: scheduled for right knee arthroscopy, open lateral, collateral, ligament reconstruction, posterolateral corner reconstruction semitendinosis allograft anterior cruciate ligament reconstruction with hamstring autograft with Dr. Walters on 10/13/2020.  Verbal and written pre-op instructions provided to patient. Patient verbalized understanding and will call surgeons office for revised instructions if surgery is rescheduled.   Pepcid for GI prophylaxis provided.   patient given verbal and written instruction with teach back on chlorhexidine shampoo, and the patient verbalized understanding with return demonstration.   Patient aware of need for COVID testing prior to  procedure and advised to coordinate with surgeon.   Patient will obtain medical clearance as per surgeons request-copy requested.     Problem: HTN (hypertension)  Assessment and Plan: Newly diagnosed- started on medication this week by PMD. Pt. instructed to continue medications as prescribed.       Problem: Allergy  Assessment and Plan: OR booking notified of allergies.

## 2020-10-09 NOTE — H&P PST ADULT - MUSCULOSKELETAL COMMENTS
right knee in brace pre-op diagnosis sprain of lateral collateral ligament of unspecified knee right knee

## 2020-10-10 LAB — SARS-COV-2 N GENE NPH QL NAA+PROBE: NOT DETECTED

## 2020-10-12 ENCOUNTER — TRANSCRIPTION ENCOUNTER (OUTPATIENT)
Age: 40
End: 2020-10-12

## 2020-10-12 VITALS
DIASTOLIC BLOOD PRESSURE: 90 MMHG | HEART RATE: 62 BPM | SYSTOLIC BLOOD PRESSURE: 146 MMHG | RESPIRATION RATE: 18 BRPM | TEMPERATURE: 98 F | OXYGEN SATURATION: 99 % | HEIGHT: 70.5 IN | WEIGHT: 197.09 LBS

## 2020-10-12 RX ORDER — OXYCODONE AND ACETAMINOPHEN 5; 325 MG/1; MG/1
5-325 TABLET ORAL
Qty: 30 | Refills: 0 | Status: ACTIVE | COMMUNITY
Start: 2020-10-12 | End: 1900-01-01

## 2020-10-12 RX ORDER — ASPIRIN 325 MG/1
325 TABLET, FILM COATED ORAL DAILY
Qty: 28 | Refills: 0 | Status: ACTIVE | COMMUNITY
Start: 2020-10-12 | End: 1900-01-01

## 2020-10-13 ENCOUNTER — APPOINTMENT (OUTPATIENT)
Dept: ORTHOPEDIC SURGERY | Facility: AMBULATORY SURGERY CENTER | Age: 40
End: 2020-10-13
Payer: MEDICAID

## 2020-10-13 ENCOUNTER — OUTPATIENT (OUTPATIENT)
Dept: OUTPATIENT SERVICES | Facility: HOSPITAL | Age: 40
LOS: 1 days | Discharge: HOME | End: 2020-10-13
Payer: MEDICAID

## 2020-10-13 VITALS
RESPIRATION RATE: 18 BRPM | OXYGEN SATURATION: 98 % | DIASTOLIC BLOOD PRESSURE: 107 MMHG | SYSTOLIC BLOOD PRESSURE: 153 MMHG | TEMPERATURE: 98 F | HEART RATE: 78 BPM

## 2020-10-13 DIAGNOSIS — S83.429D: ICD-10-CM

## 2020-10-13 DIAGNOSIS — Z98.890 OTHER SPECIFIED POSTPROCEDURAL STATES: Chronic | ICD-10-CM

## 2020-10-13 DIAGNOSIS — Z90.79 ACQUIRED ABSENCE OF OTHER GENITAL ORGAN(S): Chronic | ICD-10-CM

## 2020-10-13 PROCEDURE — 29888 ARTHRS AID ACL RPR/AGMNTJ: CPT | Mod: 82,59,LT

## 2020-10-13 PROCEDURE — 27427 RECONSTRUCTION KNEE: CPT | Mod: 82,LT

## 2020-10-13 PROCEDURE — 29888 ARTHRS AID ACL RPR/AGMNTJ: CPT | Mod: LT,59

## 2020-10-13 PROCEDURE — 27427 RECONSTRUCTION KNEE: CPT | Mod: LT

## 2020-10-13 RX ORDER — SODIUM CHLORIDE 9 MG/ML
1000 INJECTION, SOLUTION INTRAVENOUS
Refills: 0 | Status: DISCONTINUED | OUTPATIENT
Start: 2020-10-13 | End: 2020-10-27

## 2020-10-13 RX ORDER — OXYCODONE HYDROCHLORIDE 5 MG/1
1 TABLET ORAL
Qty: 30 | Refills: 0
Start: 2020-10-13

## 2020-10-13 RX ORDER — ASPIRIN/CALCIUM CARB/MAGNESIUM 324 MG
1 TABLET ORAL
Qty: 30 | Refills: 0
Start: 2020-10-13

## 2020-10-13 NOTE — ASU DISCHARGE PLAN (ADULT/PEDIATRIC) - CARE PROVIDER_API CALL
Yvon Walters  ORTHOPAEDIC SPORTS MEDICINE  [address], [address]  [place] [address]  Phone: [phone]  Fax: [phone]  Established Patient  Follow Up Time:

## 2020-10-13 NOTE — ASU DISCHARGE PLAN (ADULT/PEDIATRIC) - ASU DC SPECIAL INSTRUCTIONSFT
Alternate between taking Ibuprofen and Tylenol so you are taking pain medication every 3-4 hours if your pain is severe. Take oxycodone in addition only if tylenol and ibuprofen are not working for your pain.    Narcotic pain medicine can cause extreme nausea and constipation. Drink plenty of water and take diuretics (colace, Miralax) as needed. You can get them from your local pharmacy.    It is important to ice and elevate your leg to keep swelling down and the pain manageable. Keep the ice on for 20 minutes, and then keep off for 20 minutes. Repeat while awake.

## 2020-10-14 DIAGNOSIS — R11.2 NAUSEA WITH VOMITING, UNSPECIFIED: ICD-10-CM

## 2020-10-14 PROBLEM — S83.429A: Chronic | Status: ACTIVE | Noted: 2020-10-09

## 2020-10-14 RX ORDER — OXYCODONE AND ACETAMINOPHEN 5; 325 MG/1; MG/1
5-325 TABLET ORAL
Qty: 20 | Refills: 0 | Status: ACTIVE | COMMUNITY
Start: 2020-10-12 | End: 1900-01-01

## 2020-10-14 RX ORDER — METOCLOPRAMIDE 10 MG/1
10 TABLET ORAL 3 TIMES DAILY
Qty: 15 | Refills: 0 | Status: ACTIVE | COMMUNITY
Start: 2020-10-14 | End: 1900-01-01

## 2020-10-22 ENCOUNTER — APPOINTMENT (OUTPATIENT)
Dept: ORTHOPEDIC SURGERY | Facility: CLINIC | Age: 40
End: 2020-10-22
Payer: MEDICAID

## 2020-10-22 VITALS — BODY MASS INDEX: 27.16 KG/M2 | TEMPERATURE: 97.7 F | WEIGHT: 194 LBS | HEIGHT: 71 IN

## 2020-10-22 PROCEDURE — 99024 POSTOP FOLLOW-UP VISIT: CPT

## 2020-10-28 ENCOUNTER — APPOINTMENT (OUTPATIENT)
Dept: ORTHOPEDIC SURGERY | Facility: CLINIC | Age: 40
End: 2020-10-28

## 2020-10-29 ENCOUNTER — APPOINTMENT (OUTPATIENT)
Dept: ORTHOPEDIC SURGERY | Facility: CLINIC | Age: 40
End: 2020-10-29
Payer: MEDICAID

## 2020-10-29 VITALS — TEMPERATURE: 97.6 F

## 2020-10-29 PROCEDURE — 99024 POSTOP FOLLOW-UP VISIT: CPT

## 2020-11-25 ENCOUNTER — NON-APPOINTMENT (OUTPATIENT)
Age: 40
End: 2020-11-25

## 2020-12-02 ENCOUNTER — APPOINTMENT (OUTPATIENT)
Dept: ORTHOPEDIC SURGERY | Facility: CLINIC | Age: 40
End: 2020-12-02
Payer: MEDICAID

## 2020-12-02 VITALS — TEMPERATURE: 97.4 F

## 2020-12-02 PROCEDURE — 99024 POSTOP FOLLOW-UP VISIT: CPT

## 2021-01-14 ENCOUNTER — APPOINTMENT (OUTPATIENT)
Dept: ORTHOPEDIC SURGERY | Facility: CLINIC | Age: 41
End: 2021-01-14
Payer: MEDICAID

## 2021-01-14 VITALS — TEMPERATURE: 97.2 F

## 2021-01-14 PROCEDURE — 99072 ADDL SUPL MATRL&STAF TM PHE: CPT

## 2021-01-14 PROCEDURE — 99213 OFFICE O/P EST LOW 20 MIN: CPT

## 2021-02-02 NOTE — PHYSICAL THERAPY INITIAL EVALUATION ADULT - SITTING BALANCE: DYNAMIC
[de-identified] : Xray was reviewed today and discussed with Michael, at this point, attending physical therapy would be helpful.  Advised to do at least 4-6 weeks of therapy, if pain persists, or worsens, return for re-eval and at which point will order MRI vs physiatry for injection therapy. Follow up as needed. \par \par  good balance

## 2021-02-24 ENCOUNTER — APPOINTMENT (OUTPATIENT)
Dept: ORTHOPEDIC SURGERY | Facility: CLINIC | Age: 41
End: 2021-02-24
Payer: MEDICAID

## 2021-02-24 DIAGNOSIS — S52.571D OTHER INTRAARTICULAR FRACTURE OF LOWER END OF RIGHT RADIUS, SUBSEQUENT ENCOUNTER FOR CLOSED FRACTURE WITH ROUTINE HEALING: ICD-10-CM

## 2021-02-24 PROCEDURE — 73110 X-RAY EXAM OF WRIST: CPT | Mod: RT

## 2021-02-24 PROCEDURE — 99072 ADDL SUPL MATRL&STAF TM PHE: CPT

## 2021-02-24 PROCEDURE — 99213 OFFICE O/P EST LOW 20 MIN: CPT

## 2021-02-26 PROBLEM — S52.571D OTHER CLOSED INTRA-ARTICULAR FRACTURE OF DISTAL END OF RIGHT RADIUS WITH ROUTINE HEALING, SUBSEQUENT ENCOUNTER: Status: ACTIVE | Noted: 2020-07-20

## 2021-03-05 ENCOUNTER — APPOINTMENT (OUTPATIENT)
Dept: ORTHOPEDIC SURGERY | Facility: CLINIC | Age: 41
End: 2021-03-05
Payer: MEDICAID

## 2021-03-05 DIAGNOSIS — S83.511D SPRAIN OF ANTERIOR CRUCIATE LIGAMENT OF RIGHT KNEE, SUBSEQUENT ENCOUNTER: ICD-10-CM

## 2021-03-05 DIAGNOSIS — S83.429D: ICD-10-CM

## 2021-03-05 PROCEDURE — 99072 ADDL SUPL MATRL&STAF TM PHE: CPT

## 2021-03-05 PROCEDURE — 99213 OFFICE O/P EST LOW 20 MIN: CPT

## 2021-03-05 RX ORDER — TRAMADOL HYDROCHLORIDE 50 MG/1
50 TABLET, COATED ORAL
Qty: 60 | Refills: 0 | Status: ACTIVE | COMMUNITY
Start: 2021-03-05 | End: 1900-01-01

## 2021-04-15 RX ORDER — IBUPROFEN 800 MG/1
800 TABLET, FILM COATED ORAL
Qty: 30 | Refills: 0 | Status: ACTIVE | COMMUNITY
Start: 2021-04-15 | End: 1900-01-01

## 2021-04-21 ENCOUNTER — APPOINTMENT (OUTPATIENT)
Dept: ORTHOPEDIC SURGERY | Facility: CLINIC | Age: 41
End: 2021-04-21

## 2021-04-23 ENCOUNTER — APPOINTMENT (OUTPATIENT)
Dept: ORTHOPEDIC SURGERY | Facility: CLINIC | Age: 41
End: 2021-04-23

## 2021-04-29 ENCOUNTER — APPOINTMENT (OUTPATIENT)
Dept: ORTHOPEDIC SURGERY | Facility: CLINIC | Age: 41
End: 2021-04-29
Payer: MEDICAID

## 2021-04-29 PROCEDURE — 99072 ADDL SUPL MATRL&STAF TM PHE: CPT

## 2021-04-29 PROCEDURE — 99213 OFFICE O/P EST LOW 20 MIN: CPT

## 2021-04-30 RX ORDER — DICLOFENAC SODIUM 75 MG/1
75 TABLET, DELAYED RELEASE ORAL TWICE DAILY
Qty: 40 | Refills: 0 | Status: ACTIVE | COMMUNITY
Start: 2021-04-30 | End: 1900-01-01

## 2021-05-03 ENCOUNTER — RX RENEWAL (OUTPATIENT)
Age: 41
End: 2021-05-03

## 2021-05-27 NOTE — H&P PST ADULT - OCCUPATION
----- Message from Montez Lehman MD sent at 5/27/2021  1:05 PM CDT -----  Regarding: RE: anemia, positive aylaa  Needs to be seen within 2 weeks.  Order repeat CBC CMP LDH Haptoglobin 3 days prior  ----- Message -----  From: Cheyenne Olsen RN  Sent: 5/26/2021  10:27 AM CDT  To: Mary Mccracekn RN, Montez Lehman MD  Subject: anemia, positive ayala                          Dr. Lehman--new consult anemia and positive ayala. Please review and advise on any orders prior. Will schedule in 4-6 weeks. Thank you       unemployed

## 2021-06-10 ENCOUNTER — APPOINTMENT (OUTPATIENT)
Dept: ORTHOPEDIC SURGERY | Facility: CLINIC | Age: 41
End: 2021-06-10
Payer: MEDICAID

## 2021-06-10 VITALS
HEART RATE: 62 BPM | HEIGHT: 71 IN | SYSTOLIC BLOOD PRESSURE: 145 MMHG | WEIGHT: 190 LBS | DIASTOLIC BLOOD PRESSURE: 95 MMHG | BODY MASS INDEX: 26.6 KG/M2

## 2021-06-10 PROCEDURE — 99213 OFFICE O/P EST LOW 20 MIN: CPT

## 2021-08-13 ENCOUNTER — RX RENEWAL (OUTPATIENT)
Age: 41
End: 2021-08-13

## 2021-08-13 RX ORDER — DICLOFENAC SODIUM 50 MG/1
50 TABLET, DELAYED RELEASE ORAL
Qty: 30 | Refills: 1 | Status: ACTIVE | COMMUNITY
Start: 2021-02-24 | End: 1900-01-01

## 2021-09-01 ENCOUNTER — APPOINTMENT (OUTPATIENT)
Dept: ORTHOPEDIC SURGERY | Facility: CLINIC | Age: 41
End: 2021-09-01
Payer: MEDICAID

## 2021-09-01 VITALS
HEART RATE: 90 BPM | WEIGHT: 190 LBS | SYSTOLIC BLOOD PRESSURE: 153 MMHG | BODY MASS INDEX: 26.6 KG/M2 | DIASTOLIC BLOOD PRESSURE: 111 MMHG | HEIGHT: 71 IN

## 2021-09-01 PROCEDURE — 99213 OFFICE O/P EST LOW 20 MIN: CPT

## 2021-11-03 ENCOUNTER — APPOINTMENT (OUTPATIENT)
Dept: ORTHOPEDIC SURGERY | Facility: CLINIC | Age: 41
End: 2021-11-03
Payer: MEDICAID

## 2021-11-03 VITALS
BODY MASS INDEX: 26.32 KG/M2 | DIASTOLIC BLOOD PRESSURE: 110 MMHG | WEIGHT: 188 LBS | SYSTOLIC BLOOD PRESSURE: 173 MMHG | HEIGHT: 71 IN | HEART RATE: 71 BPM

## 2021-11-03 DIAGNOSIS — M25.561 PAIN IN RIGHT KNEE: ICD-10-CM

## 2021-11-03 DIAGNOSIS — G89.29 PAIN IN RIGHT KNEE: ICD-10-CM

## 2021-11-03 PROCEDURE — 99213 OFFICE O/P EST LOW 20 MIN: CPT

## 2021-11-17 ENCOUNTER — APPOINTMENT (OUTPATIENT)
Dept: ORTHOPEDIC SURGERY | Facility: CLINIC | Age: 41
End: 2021-11-17

## 2021-11-18 ENCOUNTER — APPOINTMENT (OUTPATIENT)
Dept: ORTHOPEDIC SURGERY | Facility: CLINIC | Age: 41
End: 2021-11-18
Payer: MEDICAID

## 2021-11-18 VITALS — BODY MASS INDEX: 26.32 KG/M2 | WEIGHT: 188 LBS | HEIGHT: 71 IN

## 2021-11-18 PROCEDURE — 73560 X-RAY EXAM OF KNEE 1 OR 2: CPT | Mod: RT

## 2021-11-18 PROCEDURE — 99213 OFFICE O/P EST LOW 20 MIN: CPT

## 2021-11-18 NOTE — PHYSICAL EXAM
[Slightly Antalgic] : slightly antalgic [LE] : Sensory: Intact in bilateral lower extremities [DP] : dorsalis pedis 2+ and symmetric bilaterally [Knee Tenderness On Palpation Right] : tenderness [Knee Meniscal Integ Jose's Displace Test Right Medial] : positive Jose's medially [Knee Meniscal Integ Jose's Displace Test Right Lateral] : negative Jose's laterally [Knee Tenderness On Palpation Left] : no tenderness [Knee Meniscal Integ Jose's Displace Test Left Medial] : negative Jose's medially [Knee Meniscal Integ Jose's Displace Test Left Lateral] : negative Jose's laterally [de-identified] : Healed surgical scars right knee.  Mild varus alignment.  Mild atrophy right thigh quadriceps musculature compared to the left. Tender right knee at medial joint line, exacerbated by flexion and rotation. Tender at the tibial tunnel site for the ACL reconstruction. 0-120° right knee flexion.  Grossly stable with varus stress in extension and with Lachman exam test. Quadriceps weakness right side compared to the left. [de-identified] : X-rays right knee AP and lateral views taken today demonstrate mild narrowing medial joint space. Postsurgical changes from ACL reconstruction with visible tunnels and radiolucent screw fixation. Tightrope button superior medial aspect of the knee from the lateral collateral ligament reconstruction. No fractures

## 2021-11-18 NOTE — HISTORY OF PRESENT ILLNESS
[Worsening] : worsening [3] : a current pain level of 3/10 [de-identified] : 1 year post right knee multi-ligament reconstruction.   Has continued right knee pain. He experiences pain after walking for 10 minutes. After exercise his knee will occasionally swell. He has taken diclofenac when needed to alleviate the knee pain and swelling. The medication has been helpful. He has new medial sided knee pain and not improving with current plan. He has been compliant with home exercises since April of 2021 and prior to that was attending PT.

## 2021-11-18 NOTE — DISCUSSION/SUMMARY
[de-identified] : One year postsurgery for multi-ligament injury right knee. Residual medial sided knee pain which is functionally limiting as noted above. x-rays taken in the office today. No fractures. Postsurgical changes from ligament reconstructions. Recommend MRI scan right knee to evaluate further. Rule out meniscus/cartilage pathology.

## 2021-11-26 ENCOUNTER — RX RENEWAL (OUTPATIENT)
Age: 41
End: 2021-11-26

## 2022-02-07 ENCOUNTER — RX RENEWAL (OUTPATIENT)
Age: 42
End: 2022-02-07

## 2022-02-07 RX ORDER — DICLOFENAC SODIUM 50 MG/1
50 TABLET, DELAYED RELEASE ORAL
Qty: 40 | Refills: 1 | Status: ACTIVE | COMMUNITY
Start: 2021-09-01 | End: 1900-01-01

## 2022-07-20 ENCOUNTER — APPOINTMENT (OUTPATIENT)
Dept: ORTHOPEDIC SURGERY | Facility: CLINIC | Age: 42
End: 2022-07-20

## 2022-07-20 VITALS — HEIGHT: 71 IN | WEIGHT: 186 LBS | BODY MASS INDEX: 26.04 KG/M2

## 2022-07-20 PROCEDURE — 99213 OFFICE O/P EST LOW 20 MIN: CPT

## 2022-07-20 PROCEDURE — 73562 X-RAY EXAM OF KNEE 3: CPT | Mod: RT

## 2022-07-28 ENCOUNTER — APPOINTMENT (OUTPATIENT)
Dept: MRI IMAGING | Facility: IMAGING CENTER | Age: 42
End: 2022-07-28

## 2022-07-28 ENCOUNTER — NON-APPOINTMENT (OUTPATIENT)
Age: 42
End: 2022-07-28

## 2022-07-28 ENCOUNTER — OUTPATIENT (OUTPATIENT)
Dept: OUTPATIENT SERVICES | Facility: HOSPITAL | Age: 42
LOS: 1 days | End: 2022-07-28
Payer: MEDICAID

## 2022-07-28 DIAGNOSIS — Z90.79 ACQUIRED ABSENCE OF OTHER GENITAL ORGAN(S): Chronic | ICD-10-CM

## 2022-07-28 DIAGNOSIS — Z98.890 OTHER SPECIFIED POSTPROCEDURAL STATES: Chronic | ICD-10-CM

## 2022-07-28 DIAGNOSIS — Z98.890 OTHER SPECIFIED POSTPROCEDURAL STATES: ICD-10-CM

## 2022-07-28 PROCEDURE — 73721 MRI JNT OF LWR EXTRE W/O DYE: CPT | Mod: 26,RT

## 2022-07-28 PROCEDURE — 73721 MRI JNT OF LWR EXTRE W/O DYE: CPT

## 2022-08-11 ENCOUNTER — APPOINTMENT (OUTPATIENT)
Dept: ORTHOPEDIC SURGERY | Facility: CLINIC | Age: 42
End: 2022-08-11

## 2022-08-11 VITALS — BODY MASS INDEX: 26.04 KG/M2 | HEIGHT: 71 IN | WEIGHT: 186 LBS

## 2022-08-11 DIAGNOSIS — M25.561 PAIN IN RIGHT KNEE: ICD-10-CM

## 2022-08-11 DIAGNOSIS — G89.29 PAIN IN RIGHT KNEE: ICD-10-CM

## 2022-08-11 PROCEDURE — 99213 OFFICE O/P EST LOW 20 MIN: CPT

## 2023-02-08 ENCOUNTER — APPOINTMENT (OUTPATIENT)
Dept: ORTHOPEDIC SURGERY | Facility: CLINIC | Age: 43
End: 2023-02-08
Payer: MEDICAID

## 2023-02-08 VITALS
BODY MASS INDEX: 26.46 KG/M2 | OXYGEN SATURATION: 96 % | SYSTOLIC BLOOD PRESSURE: 171 MMHG | TEMPERATURE: 97.4 F | DIASTOLIC BLOOD PRESSURE: 97 MMHG | HEIGHT: 71 IN | WEIGHT: 189 LBS | HEART RATE: 75 BPM

## 2023-02-08 DIAGNOSIS — Z98.890 OTHER SPECIFIED POSTPROCEDURAL STATES: ICD-10-CM

## 2023-02-08 PROCEDURE — 99213 OFFICE O/P EST LOW 20 MIN: CPT

## 2023-03-22 ENCOUNTER — APPOINTMENT (OUTPATIENT)
Dept: ORTHOPEDIC SURGERY | Facility: CLINIC | Age: 43
End: 2023-03-22

## 2023-07-26 ENCOUNTER — APPOINTMENT (OUTPATIENT)
Dept: ORTHOPEDIC SURGERY | Facility: CLINIC | Age: 43
End: 2023-07-26

## 2023-08-10 ENCOUNTER — NON-APPOINTMENT (OUTPATIENT)
Age: 43
End: 2023-08-10

## 2023-09-27 ENCOUNTER — APPOINTMENT (OUTPATIENT)
Dept: ORTHOPEDIC SURGERY | Facility: CLINIC | Age: 43
End: 2023-09-27
Payer: MEDICAID

## 2023-09-27 VITALS — HEIGHT: 71 IN | BODY MASS INDEX: 26.18 KG/M2 | WEIGHT: 187 LBS

## 2023-09-27 DIAGNOSIS — S83.411A SPRAIN OF MEDIAL COLLATERAL LIGAMENT OF RIGHT KNEE, INITIAL ENCOUNTER: ICD-10-CM

## 2023-09-27 PROCEDURE — 99213 OFFICE O/P EST LOW 20 MIN: CPT

## 2023-09-27 PROCEDURE — 73562 X-RAY EXAM OF KNEE 3: CPT | Mod: LT

## 2024-03-06 ENCOUNTER — APPOINTMENT (OUTPATIENT)
Dept: ORTHOPEDIC SURGERY | Facility: CLINIC | Age: 44
End: 2024-03-06
Payer: MEDICAID

## 2024-03-06 VITALS — WEIGHT: 185 LBS | BODY MASS INDEX: 25.9 KG/M2 | HEIGHT: 71 IN

## 2024-03-06 DIAGNOSIS — S83.281D OTHER TEAR OF LATERAL MENISCUS, CURRENT INJURY, RIGHT KNEE, SUBSEQUENT ENCOUNTER: ICD-10-CM

## 2024-03-06 DIAGNOSIS — M23.303 OTHER MENISCUS DERANGEMENTS, UNSPECIFIED MEDIAL MENISCUS, RIGHT KNEE: ICD-10-CM

## 2024-03-06 PROCEDURE — 99213 OFFICE O/P EST LOW 20 MIN: CPT

## 2024-03-22 ENCOUNTER — OUTPATIENT (OUTPATIENT)
Dept: OUTPATIENT SERVICES | Facility: HOSPITAL | Age: 44
LOS: 1 days | End: 2024-03-22

## 2024-03-22 VITALS
HEIGHT: 71 IN | SYSTOLIC BLOOD PRESSURE: 128 MMHG | TEMPERATURE: 98 F | OXYGEN SATURATION: 98 % | DIASTOLIC BLOOD PRESSURE: 84 MMHG | RESPIRATION RATE: 16 BRPM | WEIGHT: 184.97 LBS | HEART RATE: 76 BPM

## 2024-03-22 DIAGNOSIS — S83.281D OTHER TEAR OF LATERAL MENISCUS, CURRENT INJURY, RIGHT KNEE, SUBSEQUENT ENCOUNTER: ICD-10-CM

## 2024-03-22 DIAGNOSIS — Z90.79 ACQUIRED ABSENCE OF OTHER GENITAL ORGAN(S): Chronic | ICD-10-CM

## 2024-03-22 DIAGNOSIS — M25.861 OTHER SPECIFIED JOINT DISORDERS, RIGHT KNEE: ICD-10-CM

## 2024-03-22 DIAGNOSIS — S83.281A OTHER TEAR OF LATERAL MENISCUS, CURRENT INJURY, RIGHT KNEE, INITIAL ENCOUNTER: ICD-10-CM

## 2024-03-22 DIAGNOSIS — I10 ESSENTIAL (PRIMARY) HYPERTENSION: ICD-10-CM

## 2024-03-22 DIAGNOSIS — M23.303 OTHER MENISCUS DERANGEMENTS, UNSPECIFIED MEDIAL MENISCUS, RIGHT KNEE: ICD-10-CM

## 2024-03-22 DIAGNOSIS — Z98.890 OTHER SPECIFIED POSTPROCEDURAL STATES: Chronic | ICD-10-CM

## 2024-03-22 LAB
ALBUMIN SERPL ELPH-MCNC: 4.9 G/DL — SIGNIFICANT CHANGE UP (ref 3.3–5)
ALP SERPL-CCNC: 62 U/L — SIGNIFICANT CHANGE UP (ref 40–120)
ALT FLD-CCNC: 47 U/L — HIGH (ref 4–41)
ANION GAP SERPL CALC-SCNC: 15 MMOL/L — HIGH (ref 7–14)
AST SERPL-CCNC: 48 U/L — HIGH (ref 4–40)
BILIRUB SERPL-MCNC: 0.7 MG/DL — SIGNIFICANT CHANGE UP (ref 0.2–1.2)
BUN SERPL-MCNC: 9 MG/DL — SIGNIFICANT CHANGE UP (ref 7–23)
CALCIUM SERPL-MCNC: 9.2 MG/DL — SIGNIFICANT CHANGE UP (ref 8.4–10.5)
CHLORIDE SERPL-SCNC: 100 MMOL/L — SIGNIFICANT CHANGE UP (ref 98–107)
CO2 SERPL-SCNC: 27 MMOL/L — SIGNIFICANT CHANGE UP (ref 22–31)
CREAT SERPL-MCNC: 0.91 MG/DL — SIGNIFICANT CHANGE UP (ref 0.5–1.3)
EGFR: 107 ML/MIN/1.73M2 — SIGNIFICANT CHANGE UP
GLUCOSE SERPL-MCNC: 101 MG/DL — HIGH (ref 70–99)
HCT VFR BLD CALC: 51.4 % — HIGH (ref 39–50)
HGB BLD-MCNC: 18.2 G/DL — HIGH (ref 13–17)
MCHC RBC-ENTMCNC: 31.5 PG — SIGNIFICANT CHANGE UP (ref 27–34)
MCHC RBC-ENTMCNC: 35.4 GM/DL — SIGNIFICANT CHANGE UP (ref 32–36)
MCV RBC AUTO: 89.1 FL — SIGNIFICANT CHANGE UP (ref 80–100)
NRBC # BLD: 0 /100 WBCS — SIGNIFICANT CHANGE UP (ref 0–0)
NRBC # FLD: 0 K/UL — SIGNIFICANT CHANGE UP (ref 0–0)
PLATELET # BLD AUTO: 254 K/UL — SIGNIFICANT CHANGE UP (ref 150–400)
POTASSIUM SERPL-MCNC: 3.7 MMOL/L — SIGNIFICANT CHANGE UP (ref 3.5–5.3)
POTASSIUM SERPL-SCNC: 3.7 MMOL/L — SIGNIFICANT CHANGE UP (ref 3.5–5.3)
PROT SERPL-MCNC: 8.2 G/DL — SIGNIFICANT CHANGE UP (ref 6–8.3)
RBC # BLD: 5.77 M/UL — SIGNIFICANT CHANGE UP (ref 4.2–5.8)
RBC # FLD: 12.1 % — SIGNIFICANT CHANGE UP (ref 10.3–14.5)
SODIUM SERPL-SCNC: 142 MMOL/L — SIGNIFICANT CHANGE UP (ref 135–145)
WBC # BLD: 5.29 K/UL — SIGNIFICANT CHANGE UP (ref 3.8–10.5)
WBC # FLD AUTO: 5.29 K/UL — SIGNIFICANT CHANGE UP (ref 3.8–10.5)

## 2024-03-22 RX ORDER — AMLODIPINE BESYLATE 2.5 MG/1
1 TABLET ORAL
Qty: 0 | Refills: 0 | DISCHARGE

## 2024-03-22 NOTE — H&P PST ADULT - PROBLEM SELECTOR PLAN 3
Pt stated , he drinks hard liquor daily - 7- 9 drinks daily 8oz and sometimes more or less depending upon his mood. Pt is being drinking since age 13.  Pt instructed not to smoke [place] or drink alcohol within 24 hrs of surgery.  Pt verbalized understanding with return demonstration. Pt stated , he drinks hard liquor daily - 7- 9 drinks daily 8oz and sometimes more or less depending upon his mood. Pt is being drinking since age 13.  Pt instructed not to smoke [place] or drink alcohol within 24 hrs of surgery.  Pt verbalized understanding with return demonstration.  DT precautions.
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
## 2024-03-22 NOTE — H&P PST ADULT - MUSCULOSKELETAL
details… right knee pain/no joint swelling/decreased ROM/normal gait/strength 5/5 bilateral upper extremities/extremities exam right knee pain/no joint swelling/no calf tenderness/decreased ROM/normal gait/strength 5/5 bilateral upper extremities/extremities exam

## 2024-03-22 NOTE — H&P PST ADULT - NSICDXPASTMEDICALHX_GEN_ALL_CORE_FT
PAST MEDICAL HISTORY:  HTN (hypertension)     Lateral collateral ligament sprain of knee right    Other tear of lateral meniscus, current injury, right knee, subsequent encounter

## 2024-03-22 NOTE — H&P PST ADULT - PROBLEM SELECTOR PLAN 1
Patient tentatively scheduled for right knee arthroscopic debridement, partial meniscectomy on 04/02/2024  Pre-op instructions provided. Pt given verbal and written instructions with teach back on chlorhexidine wash  and pepcid. Pt verbalized understanding with return demonstration.  Labs done. Patient tentatively scheduled for right knee arthroscopic debridement, partial meniscectomy on 04/02/2024  Pre-op instructions provided. Pt given verbal and written instructions with teach back on chlorhexidine wash  and pepcid. Pt verbalized understanding with return demonstration.  Labs done.  pt instructed  not smoke or drink alcohol within 24 hrs of surgery. Patient tentatively scheduled for right knee arthroscopic debridement, partial meniscectomy on 04/02/2024  Pre-op instructions provided. Pt given verbal and written instructions with teach back on chlorhexidine wash  and pepcid. Pt verbalized understanding with return demonstration.  Labs done.  pt instructed not to smoke or drink alcohol within 24 hrs of surgery.

## 2024-03-22 NOTE — H&P PST ADULT - FUNCTIONAL STATUS
mets dasi score 7.44 Walks 1 to 2 blocks, climbs 1- 2 flight of stairs, ADLs/4-10 METS mets dasi score 7.44,  Walks 1 to 2 blocks, climbs 1- 2 flight of stairs, ADLs/4-10 METS

## 2024-03-25 DIAGNOSIS — F10.10 ALCOHOL ABUSE, UNCOMPLICATED: ICD-10-CM

## 2024-04-01 ENCOUNTER — TRANSCRIPTION ENCOUNTER (OUTPATIENT)
Age: 44
End: 2024-04-01

## 2024-04-01 RX ORDER — TRAMADOL HYDROCHLORIDE 50 MG/1
50 TABLET, COATED ORAL EVERY 8 HOURS
Qty: 15 | Refills: 0 | Status: ACTIVE | COMMUNITY
Start: 2024-04-01 | End: 1900-01-01

## 2024-04-01 NOTE — ASU DISCHARGE PLAN (ADULT/PEDIATRIC) - ASU DC SPECIAL INSTRUCTIONSFT
Please see handout from Dr. Walters for specific instructions including follow up. If you are supposed to take any specific medications postoperatively, they have already been sent to your pharmacy.

## 2024-04-01 NOTE — ASU DISCHARGE PLAN (ADULT/PEDIATRIC) - PROCEDURE
Right knee arthroscopic debridement of cyclops lesion and partial medial meniscectomy Right knee arthroscopic debridement of cyclops lesion and partial lateral meniscectomy Right knee arthroscopic debridement of cyclops lesion, removal of implant, and partial lateral meniscectomy

## 2024-04-01 NOTE — ASU DISCHARGE PLAN (ADULT/PEDIATRIC) - CARE PROVIDER_API CALL
Yvon Walters  Orthopaedic Surgery  [address], [address]  [place] [address]  Phone: [phone]  Fax: [phone]  Follow Up Time: 2 weeks

## 2024-04-01 NOTE — ASU DISCHARGE PLAN (ADULT/PEDIATRIC) - C. MAKE IMPORTANT PERSONAL OR BUSINESS DECISIONS
Spoke with the white surgery team about critical value (WBC 31 57)  Also discussed pts desire for regular diet  Will discuss during rounds  Statement Selected

## 2024-04-02 ENCOUNTER — APPOINTMENT (OUTPATIENT)
Dept: ORTHOPEDIC SURGERY | Facility: AMBULATORY SURGERY CENTER | Age: 44
End: 2024-04-02

## 2024-04-02 ENCOUNTER — OUTPATIENT (OUTPATIENT)
Dept: OUTPATIENT SERVICES | Facility: HOSPITAL | Age: 44
LOS: 1 days | Discharge: HOME | End: 2024-04-02
Payer: MEDICAID

## 2024-04-02 VITALS
OXYGEN SATURATION: 98 % | RESPIRATION RATE: 17 BRPM | DIASTOLIC BLOOD PRESSURE: 93 MMHG | TEMPERATURE: 97 F | SYSTOLIC BLOOD PRESSURE: 147 MMHG | HEART RATE: 61 BPM

## 2024-04-02 VITALS
OXYGEN SATURATION: 97 % | HEART RATE: 62 BPM | SYSTOLIC BLOOD PRESSURE: 148 MMHG | DIASTOLIC BLOOD PRESSURE: 88 MMHG | WEIGHT: 185.19 LBS | RESPIRATION RATE: 19 BRPM | TEMPERATURE: 99 F

## 2024-04-02 DIAGNOSIS — M25.861 OTHER SPECIFIED JOINT DISORDERS, RIGHT KNEE: ICD-10-CM

## 2024-04-02 DIAGNOSIS — Z98.890 OTHER SPECIFIED POSTPROCEDURAL STATES: Chronic | ICD-10-CM

## 2024-04-02 DIAGNOSIS — Z90.79 ACQUIRED ABSENCE OF OTHER GENITAL ORGAN(S): Chronic | ICD-10-CM

## 2024-04-02 PROCEDURE — 29881 ARTHRS KNE SRG MNISECTMY M/L: CPT | Mod: RT

## 2024-04-02 RX ORDER — AMLODIPINE BESYLATE 2.5 MG/1
1 TABLET ORAL
Refills: 0 | DISCHARGE

## 2024-04-02 NOTE — BRIEF OPERATIVE NOTE - OPERATION/FINDINGS
R knee arthroscopic debridement of cyclops lesion, removal of implant, and partial lateral meniscectomy

## 2024-04-02 NOTE — ASU PREOPERATIVE ASSESSMENT, ADULT ([facility]ARK ONLY) - FALL HARM RISK - UNIVERSAL INTERVENTIONS
Bed in lowest position, wheels locked, appropriate side rails in place/Call bell, personal items and telephone in reach/Instruct patient to call for assistance before getting out of bed or chair/Non-slip footwear when patient is out of bed/[place] to call system/Physically safe environment - no spills, clutter or unnecessary equipment/Purposeful Proactive Rounding/Room/bathroom lighting operational, light cord in reach

## 2024-04-02 NOTE — BRIEF OPERATIVE NOTE - FIRST ASSIST NAME
[facility] - Cardiology History & Physical     Andrew Hernandez  1934  2526/1      21      Identification:  Andrew Hernandez is a 87 y.o. male.      PCP:  Vermeesch, Marilyn K, MD  Referring MD:  Dr. Ishmael Nieves      Chief Complaint:  Tachy-Venu Syndrome, Paroxysmal Atrial Fibrillation    PROBLEM LIST/PMHx:  1.  Tachy Venu Syndrome  2.  NSTEMI   A.  2021 perioperative tongue surgery [facility]. Peak at bedtime troponin greater than 300. Patient deferred ischemic evaluation.  Echocardiogram EF 45%.  3.  Chronic Mixed systolic/diastolic congestive heart failure   A.  10/2021 2D echo: LVEF =40-45%.  Moderate hypokinesis of inferior wall.  Grade 1 diastolic dysfunction.  Normal right ventricular size and systolic function.  Mildly increased LA volume.  Mild AI, mild MR, mild TR.  Trace PI.  Moderate left pleural effusion.  4.  Paroxysmal Atrial Fibrillation   A.  Holter 2020:  2% afib 56/75/178   B.  2021 recurrent periop tongue flap sx [facility]   C. CHADS2 Vasc = 7.  On low dose Eliquis.   D.  Started on Amiodarone 10/2021 while hospitalized for CHF  5.  Essential HTN  6.  Carotid Artery Disease   A.  3/2016 Bilateral carotid artery stenosis.  status post right internal carotid artery stenting after presentation with monocular blindness (right).    B.  3/2017 PRITESH restented- Given   C.  2020 Carotid US: PRITESH patent.  Residual left nonobstructive internal carotid artery stenosis 50-69%   D.   Carotid US: Right stent imagin-49%. Left ICA 50-69%.  7.  Abdominal Bruit   A.  : Aortic diameter 1.8 cm. NO evidence of AAA.  8.  Hypothyroidism  9.  Gastroesophageal reflux disease  10.  Hypercholesterolemia  11. Chronic Hyponatremia    12.  Tongue Cancer-2021 invasive squamous cell [facility]   A.  Partial removal   B.  Soft mechanical/thickened diet  13.   COVID-19  Hospitalized/concomitant CHF [facility]                  Allergies:  is allergic to levaquin  [levofloxacin], amoxicillin, and rocephin [ceftriaxone].    Medications Prior to Admission   Medication Sig Dispense Refill Last Dose    amiodarone (PACERONE) 200 MG tablet Take 1 tablet by mouth Daily. 90 tablet 1     apixaban (ELIQUIS) 2.5 MG tablet tablet Take 1 tablet by mouth Every 12 (Twelve) Hours. 30 tablet 5     aspirin 81 MG EC tablet Take 81 mg by mouth Daily.       atorvastatin (LIPITOR) 20 MG tablet Take 1 tablet by mouth Every Night. 90 tablet 3     digoxin (LANOXIN) 125 MCG tablet Take 1 tablet by mouth Every 3 (Three) Days. 30 tablet 1     Famotidine (PEPCID PO) Take 20 mg by mouth 2 (Two) Times a Day.       ferrous sulfate 140 (45 Fe) MG tablet controlled-release tablet Take  by mouth Daily With Breakfast. SLOW RELEASE IRON 160/50       furosemide (LASIX) 40 MG tablet Alternate 20 mg every other day with 40 mg every other day. 90 tablet 1     levothyroxine (SYNTHROID, LEVOTHROID) 100 MCG tablet Take 1 tablet by mouth Daily. 90 tablet 1     metoprolol succinate XL (TOPROL-XL) 25 MG 24 hr tablet Take 1 tablet by mouth Daily. (Patient taking differently: Take 12.5 mg by mouth Daily.) 90 tablet 1     multivitamin with minerals (CENTRUM SILVER PO) Take 1 tablet by mouth Daily.       ondansetron (Zofran) 4 MG tablet Take 1 tablet by mouth Every 8 (Eight) Hours As Needed for Nausea or Vomiting. 30 tablet 2     potassium chloride 10 MEQ CR tablet Take 1 tablet by mouth Daily. Taken with lasix 90 tablet 1     sodium chloride 1 g tablet Take 1 tablet by mouth Daily 30 tablet 5     tamsulosin (FLOMAX) 0.4 MG capsule 24 hr capsule Take 1 capsule by mouth Daily. (Patient taking differently: Take 1 capsule by mouth. One capsule every three days) 90 capsule 3     vitamin C (ASCORBIC ACID) 250 MG tablet Take 500 mg by mouth Daily.                   CARDIAC RISK FACTORS:   advanced age (older than 55 for men, 65 for women), dyslipidemia, hypertension, male gender and smoking/ tobacco exposure.     HPI:  Andrew Stauffer  David is an 86 yo CM with a noted above history who was transferred to [facility] today [facility] for pacemaker implantation.  He has known paroxysmal atrial fibrillation and what appears now to be tachybradycardia syndrome.  He reports an initial diagnosis of atrial fibrillation at the time of his tongue cancer surgery but a Holter monitor in May 2020 showed a 2% A. fib burden.  He was placed on diltiazem, Toprol and Eliquis for stroke prevention at the time of surgery.  In October 2021 he was hospitalized with CHF.  It was then that he was started on oral amiodarone.  He has maintained NSR for the most part - to his knowledge.      His caretaker reports that Mr. Jose complained of some chest discomfort on December 9.  They took vitals at home with a heart rate in the 30s and a systolic blood pressure in the 70s.  He was taken to the local hospital and admitted there.  His heart rate remained in the 40s initially and his amiodarone, digoxin, Toprol were all discontinued.  He then developed atrial fibrillation with RVR and was started on IV diltiazem.  He tolerated initially but then dropped his heart rate prompting the discontinuation of the IV medication.  It was felt by cardiology that patient would benefit from a pacemaker and plans were made to transport him here as a transfer for further care and evaluation.    Mr. Jose has arrived at [facility] currently in the [facility] area.  His vitals are stable and his heart is in a normal sinus rhythm, heart rate 81 bpm.  He currently denies any chest pain, denies dyspnea, denies palpitations.  His daughter-in-law is present with him and lives with him in the same apartment.  Prior to his tongue cancer and surgery, he was quite independent doing his own groceries, cooking his own food and able to do ADLs without assistance.  After surgery, he has slowly declined but the daughter-in-law reports he is stable.  He had an initial  weight loss of approximately 20 pounds but this has plateaued.  He is eating a soft mechanical and thickened diet with no reports of aspiration.  He truly does not complain of much so when he complained of chest pain the family was quite concerned.  He has no complaints of orthopnea, no pedal edema, no dizziness.            Social History     Socioeconomic History    Marital status:    Tobacco Use    Smoking status: Former Smoker     Packs/day: 1.00     Years: 55.00     Pack years: 55.00     Types: Cigarettes     Start date:      Quit date:      Years since quittin.9    Smokeless tobacco: Former User     Types: Chew     Quit date:    Vaping Use    Vaping Use: Never used   Substance and Sexual Activity    Alcohol use: Not Currently     Alcohol/week: 0.0 standard drinks     Comment: Quit     Drug use: No    Sexual activity: Not Currently     Partners: Female     Family History   Problem Relation Age of Onset    Heart disease Mother     Hyperlipidemia Mother     Hypertension Mother     Prostate cancer Father     Cancer Father     Heart attack Brother     Heart disease Brother     Hyperlipidemia Brother     Hypertension Brother      Past Surgical History:   Procedure Laterality Date    CAROTID ENDARTERECTOMY      X2-3/17,     CAROTID STENT Right 2016    Carotid Wall Stent    CAROTID STENT Right 2017    Zilver BANDAR for recurrent right ICA stenosis    CATARACT EXTRACTION W/ INTRAOCULAR LENS IMPLANT Left 2018    Procedure: CATARACT PHACO EXTRACTION WITH INTRAOCULAR LENS IMPLANT LEFT, COMPLICATED WITH MALYUGIAN RING;  Surgeon: Paola Welch MD;  Location: [facility];  Service: Ophthalmology    KIDNEY STONE SURGERY Right     Shafron - open    PATELLA FRACTURE SURGERY Left     WA TCAT IV STENT CRV CRTD ART EMBOLIC PROTECJ Right 3/4/2017    Placement of Zilver BANDAR right ICA 3/4/17    TONGUE SURGERY  2021    cancer spot removed        Review of Systems:   "Pertinent positives are listed above and in physical exam.  All others have been reviewed and are negative.     Objective:   Vitals:   height is 180.3 cm (71\") and weight is 63.9 kg (140 lb 14 oz). His temperature is 96.5 °F (35.8 °C). His blood pressure is 176/64 and his pulse is 84. His respiration is 18 and oxygen saturation is 100%.  No intake or output data in the 24 hours ending 12/13/21 0906      Physical Exam:  General Appearance:    Alert, cooperative, in no acute distress.  Thin, appears stated age.   Head:    Normocephalic, without obvious abnormality, atraumatic   Eyes:            Lids and lashes normal, conjunctivae and sclerae normal, no   icterus, no pallor, corneas clear, PERRLA   Ears:    Ears appear intact with no abnormalities noted   Throat:   No oral lesions, no thrush, oral mucosa moist   Neck:   No adenopathy, supple, trachea midline, no thyromegaly, no carotid bruit, no JVD   Back:     No kyphosis present, no scoliosis present, no skin lesions,   erythema or scars, no tenderness to percussion or                   palpation,  range of motion normal   Lungs:     Clear to auscultation,respirations regular, even and               unlabored    Heart:    Regular rhythm and normal rate, normal S1 and S2, no        murmur, no gallop, no rub, no click       Abdomen:     Normal bowel sounds, no masses, no organomegaly, soft     non-tender, non-distended, no guarding, no rebound               tenderness       Extremities:   Moves all extremities well,  no cyanosis, no redness, no edema   Pulses:   Pulses palpable and equal bilaterally   Skin:   No bleeding, bruising or rash.  Thin.   Lymph nodes:   No palpable adenopathy   Neurologic:   Cranial nerves 2 - 12 grossly intact, sensation intact, DTR    present and equal bilaterally          Results Review:  I personally reviewed the patient's clinical results.    Results from last 7 days   Lab Units 12/11/21  0532   WBC 10*3/mm3 4.81   HEMOGLOBIN g/dL 9.8* "   HEMATOCRIT % 29.7*   PLATELETS 10*3/mm3 229     Results from last 7 days   Lab Units 12/12/21  1028 12/11/21  0532 12/10/21  0619   SODIUM mmol/L 123*   < > 123*   POTASSIUM mmol/L 4.1   < > 4.4   CHLORIDE mmol/L 89*   < > 89*   CO2 mmol/L 25.3   < > 26.7   BUN mg/dL 12   < > 13   CREATININE mg/dL 0.92   < > 0.91   CALCIUM mg/dL 8.2*   < > 8.3*   BILIRUBIN mg/dL  --   --  0.4   ALK PHOS U/L  --   --  89   ALT (SGPT) U/L  --   --  8   AST (SGOT) U/L  --   --  15   GLUCOSE mg/dL 149*   < > 91    < > = values in this interval not displayed.         Results from last 7 days   Lab Units 12/09/21  1309   TSH uIU/mL 5.030*               Radiology:  Imaging Results (Last 72 Hours)       ** No results found for the last 72 hours. **            Tele: Normal sinus rhythm    Assessment and Plan:    1.  Tachy Venu Syndrome  -This is an 87-year-old  male who first had atrial fibrillation (2% burden) by Holter monitor May 2020.  He then developed atrial fibrillation with RVR postoperatively after partial tongue removal for tongue cancer August 2021.  It was at that time that he was started on diltiazem, digoxin and Toprol for rate control along with Eliquis for stroke prevention.  Family states he was persistent with his arrhythmia and hospitalized in October 2021 for CHF.  It was at that time that he was started on oral amiodarone and has converted to sinus rhythm.  -Heart rates at home and outside facility have been documented as low as 35 bpm.  When his medications are held he has heart rates in the 140s and is symptomatic.  -Transferred here for pacemaker implantation.  He does appear to have tachybradycardia syndrome.  Risk and benefits of permanent pacemaker implantation were reviewed with the patient and daughter-in-law and they wish to proceed.  He is not a candidate for leadless pacemaker as he requires atrial pacing, but would likely benefit from dual-chamber at this time.  His EF by recent echo was 40 to  45%.  QRS is 98 ms.  They inquired about a biventricular device, but he does currently not meet criteria for this.  -COVID-19 screening is negative.  Of note, patient did have COVID-19 infection September 2021.    2.  Chronic Combined CHF  -Currently appears to be well compensated.  Last recent EF by echo was 40 to 45%.  -Continue current medical regimen and follow routinely with PCP and primary cardiologist, Dr. Mcnair.     3.  Paroxysmal Atrial Fibrillation  -  CHADS2 Vasc = 7.  On Eliquis 2.5mg BID at home.  -EKG today shows normal sinus rhythm, heart rate 81 bpm.  4.  Essential Hypertension  - controlled  -  Monitor    5.  Chronic Hyponatremia  -  Labs reviewed.    I discussed the patients findings and my recommendations with the patient, any present family members, and the nursing staff.  Destin Howe MD saw and examined patient, verified hx and PE, read all radiographic studies, reviewed labs and micro data, and formulated dx, plan for treatment and all medical decision making.      Mary Jane Johnson PA-C for Destin Howe MD  12/13/21 09:27 [facility]          CARDIAC ELECTROPHYSIOLOGIST ADDENDUM    I have personally performed a face to face diagnostic evaluation on this patient.  I have reviewed the note authored by the advanced practice provider and agree with the history of present illness, past medical history, surgical history, social history, family history and review of systems.. I have reviewed current medications, and lab values.  My findings are below:  .    History of Present Illness:  87 y.o. male with a history of atrial fibrillation and tachybradycardia syndrome.  He has recently been treated with amiodarone, metoprolol, and digoxin.  When in atrial fibrillation he is very symptomatic with tachycardic rates.  With the above medical treatment he has had symptomatic bradycardia.  He recently presented to [facility] with sinus rates in the 30s.  He felt very poorly with this.  His medications were  "stopped and he was transferred here for further care.    Review of Systems:  As documented in the note above     Physical Exam   Vital Signs: /54 (BP Location: Left arm)   Pulse 84   Temp 96.5 °F (35.8 °C)   Resp 18   Ht 180.3 cm (71\")   Wt 63.9 kg (140 lb 14 oz)   SpO2 100%   BMI 19.65 kg/m²        Admission Weight: 63.9 kg (140 lb 14 oz)     General appearance: Alert oriented and cooperative.  In no acute distress  Skin:  Warm and Dry to touch  Head: Normocephalic, without obvious abnormality, atraumatic.   Eyes: Conjunctivae unremarkable, EOM's intact.Sclera non icteric.  Neck: No JVD, No carotid bruit. Neck supple, trachea midline  Lungs: Clear to auscultation bilaterally, no use of accessory muscles.    Heart:: RRR with Normal S1 and S2 . No murmurs and no gallops.  Abdomen: Soft, nontender. Bowel sounds normal.  Extremities: No edema.  Neurologic: Oriented to time, person and place, affect appropriate.  No focal/major motor or sensory defects noted.    Psychiatric:  Appropriate mood, memory and judgment.  Good use of semantics and logic.    PLAN:    He has symptomatic tachybradycardia syndrome as mentioned above.  His rates have been difficult to control due to his bradycardia when in sinus rhythm.  We discussed the potential management options going forward.  He would benefit from permanent pacing to prevent bradycardic episodes going forward and to allow further medications for rate control.  He would manifest most from a dual-chamber pacing system so that he can primarily paced the atrium.  Due to his mildly reduced ejection fraction we would like to avoid ventricular pacing.  I discussed this with him and his family and they would like to proceed with this.  After pacemaker is implanted would likely plan to start his amiodarone and metoprolol back.  We will see how his rates do without the digoxin, as this does have some pro atrial fibrillation properties and I would like to avoid it if " possible going forward.       Destin Howe MD          Yg Chiu (Resident)

## 2024-04-03 RX ORDER — OXYCODONE 5 MG/1
5 TABLET ORAL EVERY 6 HOURS
Qty: 8 | Refills: 0 | Status: ACTIVE | COMMUNITY
Start: 2024-04-03 | End: 1900-01-01

## 2024-04-10 ENCOUNTER — APPOINTMENT (OUTPATIENT)
Dept: ORTHOPEDIC SURGERY | Facility: CLINIC | Age: 44
End: 2024-04-10
Payer: MEDICAID

## 2024-04-10 VITALS
HEIGHT: 71 IN | HEART RATE: 58 BPM | DIASTOLIC BLOOD PRESSURE: 93 MMHG | WEIGHT: 185 LBS | SYSTOLIC BLOOD PRESSURE: 152 MMHG | BODY MASS INDEX: 25.9 KG/M2

## 2024-04-10 DIAGNOSIS — Z98.890 OTHER SPECIFIED POSTPROCEDURAL STATES: ICD-10-CM

## 2024-04-10 DIAGNOSIS — M25.861 OTHER SPECIFIED JOINT DISORDERS, RIGHT KNEE: ICD-10-CM

## 2024-04-10 PROBLEM — S83.281D OTHER TEAR OF LATERAL MENISCUS, CURRENT INJURY, RIGHT KNEE, SUBSEQUENT ENCOUNTER: Chronic | Status: ACTIVE | Noted: 2024-03-22

## 2024-04-10 PROCEDURE — 99024 POSTOP FOLLOW-UP VISIT: CPT

## 2024-05-15 ENCOUNTER — APPOINTMENT (OUTPATIENT)
Dept: ORTHOPEDIC SURGERY | Facility: CLINIC | Age: 44
End: 2024-05-15

## 2024-05-22 ENCOUNTER — APPOINTMENT (OUTPATIENT)
Dept: ORTHOPEDIC SURGERY | Facility: CLINIC | Age: 44
End: 2024-05-22
Payer: MEDICAID

## 2024-05-22 VITALS
WEIGHT: 186 LBS | SYSTOLIC BLOOD PRESSURE: 143 MMHG | HEIGHT: 71 IN | DIASTOLIC BLOOD PRESSURE: 93 MMHG | BODY MASS INDEX: 26.04 KG/M2

## 2024-05-22 DIAGNOSIS — Z98.890 OTHER SPECIFIED POSTPROCEDURAL STATES: ICD-10-CM

## 2024-05-22 PROCEDURE — 99024 POSTOP FOLLOW-UP VISIT: CPT

## 2024-08-21 ENCOUNTER — APPOINTMENT (OUTPATIENT)
Dept: ORTHOPEDIC SURGERY | Facility: CLINIC | Age: 44
End: 2024-08-21
Payer: MEDICAID

## 2024-08-21 VITALS — WEIGHT: 188 LBS | BODY MASS INDEX: 26.32 KG/M2 | HEIGHT: 71 IN

## 2024-08-21 DIAGNOSIS — M94.261 CHONDROMALACIA, RIGHT KNEE: ICD-10-CM

## 2024-08-21 DIAGNOSIS — M25.561 PAIN IN RIGHT KNEE: ICD-10-CM

## 2024-08-21 DIAGNOSIS — G89.29 PAIN IN RIGHT KNEE: ICD-10-CM

## 2024-08-21 PROCEDURE — 99213 OFFICE O/P EST LOW 20 MIN: CPT

## 2024-08-21 RX ORDER — MELOXICAM 15 MG/1
15 TABLET ORAL
Qty: 30 | Refills: 1 | Status: ACTIVE | COMMUNITY
Start: 2024-08-21 | End: 1900-01-01

## 2024-11-11 ENCOUNTER — RX RENEWAL (OUTPATIENT)
Age: 44
End: 2024-11-11

## 2024-11-20 ENCOUNTER — APPOINTMENT (OUTPATIENT)
Dept: ORTHOPEDIC SURGERY | Facility: CLINIC | Age: 44
End: 2024-11-20

## 2025-01-24 ENCOUNTER — APPOINTMENT (OUTPATIENT)
Dept: ORTHOPEDIC SURGERY | Facility: CLINIC | Age: 45
End: 2025-01-24
Payer: MEDICAID

## 2025-01-24 ENCOUNTER — RX RENEWAL (OUTPATIENT)
Age: 45
End: 2025-01-24

## 2025-01-24 VITALS — HEIGHT: 71 IN | BODY MASS INDEX: 25.9 KG/M2 | WEIGHT: 185 LBS

## 2025-01-24 DIAGNOSIS — Z98.890 OTHER SPECIFIED POSTPROCEDURAL STATES: ICD-10-CM

## 2025-01-24 DIAGNOSIS — G89.29 PAIN IN RIGHT KNEE: ICD-10-CM

## 2025-01-24 DIAGNOSIS — M25.561 PAIN IN RIGHT KNEE: ICD-10-CM

## 2025-01-24 PROCEDURE — 99212 OFFICE O/P EST SF 10 MIN: CPT

## (undated) DEVICE — VENODYNE/SCD SLEEVE CALF MEDIUM

## (undated) DEVICE — NDL HYPO SAFE 18G X 1.5" (PINK)

## (undated) DEVICE — FLOORMAT STRYKER SUCTION LOW PROFILE 50X34

## (undated) DEVICE — PREP CHLORAPREP HI-LITE ORANGE 26ML

## (undated) DEVICE — TUBING DEPUY MITEK FMS OUTFLOW

## (undated) DEVICE — S&N ARTHROCARE WAND TURBOVAC 90 DEGREE

## (undated) DEVICE — SYR LUER LOK 50CC

## (undated) DEVICE — BASIN SET SINGLE

## (undated) DEVICE — SHAVER BLADE S&N SYNOVATOR 4.5MM CURVED (FOREST GREEN)

## (undated) DEVICE — PACK KNEE ARTHROSCOPY

## (undated) DEVICE — TOURNIQUET CUFF 34" DUAL PORT W PLC

## (undated) DEVICE — TOURNIQUET CUFF 24" DUAL PORT SINGLE BLADDER LUER LOCK  (BLACK)

## (undated) DEVICE — SUT ETHILON 3-0 18" FS-1

## (undated) DEVICE — GLV 8 PROTEXIS (CREAM) NEU-THERA

## (undated) DEVICE — SHAVER BLADE S&N FULL RADIUS BONECUTTER 5.5MM (ORANGE)

## (undated) DEVICE — NDL SPINAL 18G X 3.5" (PINK)

## (undated) DEVICE — TUBING DEPUY MITEK FMS INFLOW

## (undated) DEVICE — DRSG ACE BANDAGE 6"

## (undated) DEVICE — SOL IRR BAG NS 0.9% 3000ML

## (undated) DEVICE — POSITIONER PATIENT SAFETY STRAP 3X60"

## (undated) DEVICE — NDL HYPO SAFE 21G X 1.5" (GREEN)

## (undated) DEVICE — ELCTR ROCKER SWITCH PENCIL BLUE 10FT

## (undated) DEVICE — WARMING BLANKET UPPER ADULT

## (undated) DEVICE — POSITIONER FOAM EGG CRATE ULNAR 2PCS (PINK)